# Patient Record
Sex: FEMALE | Race: WHITE
[De-identification: names, ages, dates, MRNs, and addresses within clinical notes are randomized per-mention and may not be internally consistent; named-entity substitution may affect disease eponyms.]

---

## 2017-03-30 ENCOUNTER — HOSPITAL ENCOUNTER (OUTPATIENT)
Dept: HOSPITAL 17 - HRAD | Age: 67
End: 2017-03-30
Attending: THORACIC SURGERY (CARDIOTHORACIC VASCULAR SURGERY)
Payer: MEDICARE

## 2017-03-30 DIAGNOSIS — C34.92: Primary | ICD-10-CM

## 2017-03-30 PROCEDURE — 76140 X-RAY CONSULTATION: CPT

## 2017-03-30 NOTE — RADRPT
EXAM DATE/TIME:  03/30/2017 00:00 

 

[facility] COMPARISON:     No previous studies available for comparison.

OUTSIDE STUDY REVIEWED:    

                  

INDICATIONS :       CT GUIDED BIOPSY R UPPER LOBE

                  

 

FINDINGS:  Images demonstrate a 5 mm nodule right upper lobe which is too small for biopsy. This does
 demonstrate increased FDG activity and is suspicious for malignancy. Considering there is an approxi
mately 8 weeks since the PET scan repeat CT scan is recommended to evaluate for increased size.

 

RECOMMENDATION: 

Lesion is too small for biopsy as visualized. Recommend repeat noncontrast CT of the chest

 

 

 

 

 Ranjit Oquendo MD on March 30, 2017 at 15:07           

Board Certified Radiologist.

 This report was verified electronically.

## 2017-04-07 ENCOUNTER — HOSPITAL ENCOUNTER (INPATIENT)
Dept: HOSPITAL 17 - HSDI | Age: 67
LOS: 5 days | Discharge: HOME HEALTH | DRG: 165 | End: 2017-04-12
Attending: THORACIC SURGERY (CARDIOTHORACIC VASCULAR SURGERY) | Admitting: THORACIC SURGERY (CARDIOTHORACIC VASCULAR SURGERY)
Payer: MEDICARE

## 2017-04-07 ENCOUNTER — HOSPITAL ENCOUNTER (OUTPATIENT)
Dept: HOSPITAL 17 - CPRE | Age: 67
End: 2017-04-07
Attending: THORACIC SURGERY (CARDIOTHORACIC VASCULAR SURGERY)
Payer: MEDICARE

## 2017-04-07 VITALS — WEIGHT: 106.48 LBS | BODY MASS INDEX: 18.87 KG/M2 | HEIGHT: 63 IN

## 2017-04-07 DIAGNOSIS — R94.31: ICD-10-CM

## 2017-04-07 DIAGNOSIS — E78.5: ICD-10-CM

## 2017-04-07 DIAGNOSIS — C34.32: Primary | ICD-10-CM

## 2017-04-07 DIAGNOSIS — I10: ICD-10-CM

## 2017-04-07 DIAGNOSIS — J44.9: ICD-10-CM

## 2017-04-07 DIAGNOSIS — C34.92: ICD-10-CM

## 2017-04-07 DIAGNOSIS — Z01.812: Primary | ICD-10-CM

## 2017-04-07 LAB
ANION GAP SERPL CALC-SCNC: 5 MEQ/L (ref 5–15)
APTT BLD: 24.9 SEC (ref 24.3–30.1)
BUN SERPL-MCNC: 7 MG/DL (ref 7–18)
CHLORIDE SERPL-SCNC: 106 MEQ/L (ref 98–107)
COLOR UR: YELLOW
COMMENT (UR): (no result)
CULTURE IF INDICATED: (no result)
ERYTHROCYTE [DISTWIDTH] IN BLOOD BY AUTOMATED COUNT: 13.4 % (ref 11.6–17.2)
GFR SERPLBLD BASED ON 1.73 SQ M-ARVRAT: 109 ML/MIN (ref 89–?)
GLUCOSE UR STRIP-MCNC: (no result) MG/DL
HCO3 BLD-SCNC: 29.3 MEQ/L (ref 21–32)
HCT VFR BLD CALC: 41 % (ref 35–46)
HGB UR QL STRIP: (no result)
INR PPP: 0.9 RATIO
KETONES UR STRIP-MCNC: (no result) MG/DL
MCH RBC QN AUTO: 29.5 PG (ref 27–34)
MCHC RBC AUTO-ENTMCNC: 32.8 % (ref 32–36)
MCV RBC AUTO: 90 FL (ref 80–100)
NITRITE UR QL STRIP: (no result)
PLATELET # BLD: 269 TH/MM3 (ref 150–450)
POTASSIUM SERPL-SCNC: 4.4 MEQ/L (ref 3.5–5.1)
PROTHROMBIN TIME: 9.9 SEC (ref 9.8–11.6)
RBC # BLD AUTO: 4.55 MIL/MM3 (ref 4–5.3)
REVIEW FLAG: (no result)
SODIUM SERPL-SCNC: 140 MEQ/L (ref 136–145)
SP GR UR STRIP: 1.01 (ref 1–1.03)
WBC # BLD AUTO: 12.3 TH/MM3 (ref 4–11)

## 2017-04-07 PROCEDURE — 85610 PROTHROMBIN TIME: CPT

## 2017-04-07 PROCEDURE — 87015 SPECIMEN INFECT AGNT CONCNTJ: CPT

## 2017-04-07 PROCEDURE — 93005 ELECTROCARDIOGRAM TRACING: CPT

## 2017-04-07 PROCEDURE — 87116 MYCOBACTERIA CULTURE: CPT

## 2017-04-07 PROCEDURE — 88307 TISSUE EXAM BY PATHOLOGIST: CPT

## 2017-04-07 PROCEDURE — 85730 THROMBOPLASTIN TIME PARTIAL: CPT

## 2017-04-07 PROCEDURE — 80048 BASIC METABOLIC PNL TOTAL CA: CPT

## 2017-04-07 PROCEDURE — 86850 RBC ANTIBODY SCREEN: CPT

## 2017-04-07 PROCEDURE — C9290 INJ, BUPIVACAINE LIPOSOME: HCPCS

## 2017-04-07 PROCEDURE — 36415 COLL VENOUS BLD VENIPUNCTURE: CPT

## 2017-04-07 PROCEDURE — 94640 AIRWAY INHALATION TREATMENT: CPT

## 2017-04-07 PROCEDURE — 85027 COMPLETE CBC AUTOMATED: CPT

## 2017-04-07 PROCEDURE — 86900 BLOOD TYPING SEROLOGIC ABO: CPT

## 2017-04-07 PROCEDURE — 87070 CULTURE OTHR SPECIMN AEROBIC: CPT

## 2017-04-07 PROCEDURE — 87206 SMEAR FLUORESCENT/ACID STAI: CPT

## 2017-04-07 PROCEDURE — 86901 BLOOD TYPING SEROLOGIC RH(D): CPT

## 2017-04-07 PROCEDURE — 94664 DEMO&/EVAL PT USE INHALER: CPT

## 2017-04-07 PROCEDURE — 88305 TISSUE EXAM BY PATHOLOGIST: CPT

## 2017-04-07 PROCEDURE — 87102 FUNGUS ISOLATION CULTURE: CPT

## 2017-04-07 PROCEDURE — 81001 URINALYSIS AUTO W/SCOPE: CPT

## 2017-04-07 PROCEDURE — 87205 SMEAR GRAM STAIN: CPT

## 2017-04-07 PROCEDURE — 71010: CPT

## 2017-04-07 PROCEDURE — 94150 VITAL CAPACITY TEST: CPT

## 2017-04-08 NOTE — EKG
Date Performed: 04/07/2017       Time Performed: 12:27:55

 

PTAGE:      66 years

 

EKG:      Sinus rhythm 

 

 LOW QRS VOLTAGE IN EXTREMITY LEADS Compared to previous tracing, previously noted anterolateral ST c
hanges have all improved. Clinical correlation is strongly recommended BORDERLINE ECG 

 

NO PREVIOUS TRACING            

 

DOCTOR:   Carole Zambrano  Interpretating Date/Time  04/08/2017 15:25:37

## 2017-04-10 VITALS
TEMPERATURE: 98.3 F | DIASTOLIC BLOOD PRESSURE: 54 MMHG | OXYGEN SATURATION: 97 % | RESPIRATION RATE: 18 BRPM | HEART RATE: 82 BPM | SYSTOLIC BLOOD PRESSURE: 109 MMHG

## 2017-04-10 VITALS
TEMPERATURE: 98.2 F | DIASTOLIC BLOOD PRESSURE: 63 MMHG | RESPIRATION RATE: 16 BRPM | HEART RATE: 79 BPM | SYSTOLIC BLOOD PRESSURE: 104 MMHG | OXYGEN SATURATION: 99 %

## 2017-04-10 VITALS
DIASTOLIC BLOOD PRESSURE: 70 MMHG | TEMPERATURE: 97.4 F | OXYGEN SATURATION: 100 % | HEART RATE: 70 BPM | RESPIRATION RATE: 16 BRPM | SYSTOLIC BLOOD PRESSURE: 119 MMHG

## 2017-04-10 VITALS
DIASTOLIC BLOOD PRESSURE: 94 MMHG | SYSTOLIC BLOOD PRESSURE: 154 MMHG | TEMPERATURE: 97.6 F | OXYGEN SATURATION: 98 % | HEART RATE: 92 BPM | RESPIRATION RATE: 20 BRPM

## 2017-04-10 VITALS
HEART RATE: 64 BPM | SYSTOLIC BLOOD PRESSURE: 119 MMHG | TEMPERATURE: 97.4 F | OXYGEN SATURATION: 99 % | DIASTOLIC BLOOD PRESSURE: 71 MMHG | RESPIRATION RATE: 16 BRPM

## 2017-04-10 VITALS
OXYGEN SATURATION: 97 % | SYSTOLIC BLOOD PRESSURE: 101 MMHG | RESPIRATION RATE: 18 BRPM | TEMPERATURE: 97.9 F | HEART RATE: 72 BPM | DIASTOLIC BLOOD PRESSURE: 54 MMHG

## 2017-04-10 VITALS — OXYGEN SATURATION: 96 %

## 2017-04-10 PROCEDURE — 0BTC4ZZ RESECTION OF RIGHT UPPER LUNG LOBE, PERCUTANEOUS ENDOSCOPIC APPROACH: ICD-10-PCS | Performed by: THORACIC SURGERY (CARDIOTHORACIC VASCULAR SURGERY)

## 2017-04-10 PROCEDURE — 3E0T3CZ: ICD-10-PCS | Performed by: THORACIC SURGERY (CARDIOTHORACIC VASCULAR SURGERY)

## 2017-04-10 RX ADMIN — ACETAMINOPHEN SCH MG: 10 INJECTION, SOLUTION INTRAVENOUS at 10:00

## 2017-04-10 RX ADMIN — PANTOPRAZOLE SCH MG: 40 TABLET, DELAYED RELEASE ORAL at 21:51

## 2017-04-10 RX ADMIN — DOCUSATE CALCIUM SCH MG: 240 CAPSULE, LIQUID FILLED ORAL at 21:51

## 2017-04-10 RX ADMIN — ACETAMINOPHEN SCH MG: 10 INJECTION, SOLUTION INTRAVENOUS at 16:00

## 2017-04-10 RX ADMIN — ALBUTEROL SULFATE SCH MG: 2.5 SOLUTION RESPIRATORY (INHALATION) at 22:17

## 2017-04-10 RX ADMIN — ACETAMINOPHEN SCH MG: 10 INJECTION, SOLUTION INTRAVENOUS at 21:51

## 2017-04-10 RX ADMIN — CLONIDINE HYDROCHLORIDE SCH MG: 0.1 INJECTION, SOLUTION EPIDURAL at 10:00

## 2017-04-10 RX ADMIN — HYDROCODONE BITARTRATE AND ACETAMINOPHEN PRN TAB: 5; 325 TABLET ORAL at 13:08

## 2017-04-10 RX ADMIN — CLONIDINE HYDROCHLORIDE SCH MG: 0.1 INJECTION, SOLUTION EPIDURAL at 21:51

## 2017-04-10 RX ADMIN — CLONIDINE HYDROCHLORIDE SCH MG: 0.1 INJECTION, SOLUTION EPIDURAL at 16:01

## 2017-04-10 RX ADMIN — ALBUTEROL SULFATE SCH MG: 2.5 SOLUTION RESPIRATORY (INHALATION) at 15:33

## 2017-04-10 NOTE — RADRPT
EXAM DATE/TIME:  04/10/2017 09:38 

 

[facility] COMPARISON:     

No previous studies available for comparison.

 

                     

INDICATIONS :     

Post thoracotomy.

                     

 

MEDICAL HISTORY :     

Hypercholesterolemia.  Hypertension        

 

SURGICAL HISTORY :        

Cardiac cath, Stent.

 

ENCOUNTER:     

Initial                                        

 

ACUITY:     

1 day      

 

PAIN SCORE:     

Non-responsive.

 

LOCATION:     

Bilateral chest 

 

FINDINGS:     

There is hyperinflation. Heart size upper limits normal. Aortic calcification is seen. Right-sided ch
est tube is present with tip overlying the right lung apex. A pneumothorax is not clearly visualized.
 The osseous structures are intact. There is increased density in the right paratracheal region which
 can represent adenopathy or mass.

 

CONCLUSION:     

No evidence of pneumothorax with chest tube in place.

 

 

 

 Félix Cooper MD on April 10, 2017 at 10:14           

Board Certified Radiologist.

 This report was verified electronically.

## 2017-04-10 NOTE — PD.OP
cc:   JOSE Partida MD; Israel Mead MD


__________________________________________________





Operative Report


Date of Surgery:  Apr 10, 2017


Preoperative Diagnosis:  


Postoperative Diagnosis:  


Procedure:








1. Right Video-Assisted Thoracoscopic Surgery (VATS). 


2. Resection of Right Upper Lobe Mass 


3. Intercostal Nerve Block








.


Surgeon:


Israel Mead


Assistant(s):


CHERELLE Talley


Operation and Findings:











PREOPERATIVE DIAGNOSES 


1. Right Upper Lobe Lung Mass. 


2. Left Lower Lobe Lung Cancer


3. COPD 





POSTOPERATIVE DIAGNOSES 


Same





SURGICAL PROCEDURE 


1. Right Video-Assisted Thoracoscopic Surgery (VATS). 


2. Resection of Right Upper Lobe Mass 


3. Intercostal Nerve Block





SURGEON 


Israel Mead MD 





ASSISTANT 


Kathy talley, [facility]





ANESTHESIA 


General double lumen endotracheal. 





ANESTHETIST 


DANIEL Roberson MD





PREPARATION 


ChloraPrep. 





COUNTS 


Needle, sponge, and instrument counts are correct. 





DRAINS 


One 28 Fr CT. 





COMPLICATIONS 


None. 





INDICATIONS 


The patient is a 67 yo lady with left lower lobe lung cancer as well as a right 

upper lobe mass. The patient is being brought to the operating room for 

resection of the right upper lobe mass. 





DESCRIPTION OF PROCEDURE 


The patient was brought to the operating room and placed supine on the OR 

table. Following the induction of adequate general double lumen endotracheal 

anesthesia and placement of appropriate monitoring devices, the patient was 

placed in the left lateral decubitus position. The right chest and surrounding 

areas were then prepped and draped in a standard sterile fashion. A 5 mm camera 

port was introduced into the 7th intercostal space in mid axillary line, and 

the camera introduced. A second 5 mm port was then placed anteriorly under 

direct visual guidance and one posteriorly. The apical segment of the right 

upper lobe was grasped and resected using a ROSALEE stapler. The specimen was 

bagged and removed through the posterior port. The specimen was opened and the 

nodule identified, cultured and sent for definitive histological analysis. The 

middle port was removed and a 28 Fr CT was placed into the pleural space and 

exited through the chest wall. This was maintained in place with a 

nonabsorbable suture. All of the entry sites were injected with Exparel. 

Following confirmation of the catheter in the proper place, the incisions were 

closed with 3 layers. The CT was attached to a suction device, and sterile 

dressing applied. Intercostal nerve block was performed using Ropivacaine/

Morphine solution at the level of the incisions as well as 2 rib spaces above 

and below. The patient tolerated the procedure well and was extubated and 

transferred to the recovery room in stable condition.








Israel Mead MD Apr 10, 2017 13:58

## 2017-04-11 VITALS
RESPIRATION RATE: 18 BRPM | DIASTOLIC BLOOD PRESSURE: 71 MMHG | HEART RATE: 78 BPM | SYSTOLIC BLOOD PRESSURE: 148 MMHG | OXYGEN SATURATION: 100 % | TEMPERATURE: 98 F

## 2017-04-11 VITALS — HEART RATE: 89 BPM

## 2017-04-11 VITALS
RESPIRATION RATE: 20 BRPM | OXYGEN SATURATION: 98 % | HEART RATE: 85 BPM | DIASTOLIC BLOOD PRESSURE: 65 MMHG | SYSTOLIC BLOOD PRESSURE: 118 MMHG | TEMPERATURE: 98.1 F

## 2017-04-11 VITALS
TEMPERATURE: 98 F | RESPIRATION RATE: 16 BRPM | OXYGEN SATURATION: 96 % | SYSTOLIC BLOOD PRESSURE: 140 MMHG | HEART RATE: 84 BPM | DIASTOLIC BLOOD PRESSURE: 81 MMHG

## 2017-04-11 VITALS
SYSTOLIC BLOOD PRESSURE: 141 MMHG | OXYGEN SATURATION: 97 % | DIASTOLIC BLOOD PRESSURE: 84 MMHG | RESPIRATION RATE: 18 BRPM | TEMPERATURE: 97.8 F | HEART RATE: 87 BPM

## 2017-04-11 VITALS — HEART RATE: 79 BPM

## 2017-04-11 VITALS — OXYGEN SATURATION: 96 %

## 2017-04-11 VITALS — HEART RATE: 82 BPM

## 2017-04-11 VITALS — OXYGEN SATURATION: 98 %

## 2017-04-11 VITALS
SYSTOLIC BLOOD PRESSURE: 101 MMHG | HEART RATE: 81 BPM | TEMPERATURE: 97.9 F | RESPIRATION RATE: 18 BRPM | OXYGEN SATURATION: 96 % | DIASTOLIC BLOOD PRESSURE: 60 MMHG

## 2017-04-11 VITALS
TEMPERATURE: 98.2 F | SYSTOLIC BLOOD PRESSURE: 149 MMHG | RESPIRATION RATE: 20 BRPM | HEART RATE: 90 BPM | OXYGEN SATURATION: 98 % | DIASTOLIC BLOOD PRESSURE: 76 MMHG

## 2017-04-11 VITALS — HEART RATE: 90 BPM

## 2017-04-11 VITALS — HEART RATE: 76 BPM

## 2017-04-11 VITALS — HEART RATE: 80 BPM

## 2017-04-11 VITALS — OXYGEN SATURATION: 97 %

## 2017-04-11 VITALS — HEART RATE: 92 BPM

## 2017-04-11 VITALS — HEART RATE: 78 BPM

## 2017-04-11 RX ADMIN — CLONIDINE HYDROCHLORIDE SCH MG: 0.1 INJECTION, SOLUTION EPIDURAL at 04:05

## 2017-04-11 RX ADMIN — DOCUSATE CALCIUM SCH MG: 240 CAPSULE, LIQUID FILLED ORAL at 20:42

## 2017-04-11 RX ADMIN — ACETAMINOPHEN SCH MG: 10 INJECTION, SOLUTION INTRAVENOUS at 04:00

## 2017-04-11 RX ADMIN — ALBUTEROL SULFATE SCH MG: 2.5 SOLUTION RESPIRATORY (INHALATION) at 15:23

## 2017-04-11 RX ADMIN — PANTOPRAZOLE SCH MG: 40 TABLET, DELAYED RELEASE ORAL at 20:42

## 2017-04-11 RX ADMIN — HYDROCODONE BITARTRATE AND ACETAMINOPHEN PRN TAB: 5; 325 TABLET ORAL at 20:47

## 2017-04-11 RX ADMIN — HYDROCODONE BITARTRATE AND ACETAMINOPHEN PRN TAB: 5; 325 TABLET ORAL at 11:43

## 2017-04-11 RX ADMIN — ALBUTEROL SULFATE SCH MG: 2.5 SOLUTION RESPIRATORY (INHALATION) at 04:53

## 2017-04-11 RX ADMIN — ALBUTEROL SULFATE SCH MG: 2.5 SOLUTION RESPIRATORY (INHALATION) at 19:37

## 2017-04-11 RX ADMIN — LISINOPRIL SCH MG: 20 TABLET ORAL at 20:42

## 2017-04-11 RX ADMIN — ASPIRIN SCH MG: 81 TABLET ORAL at 11:43

## 2017-04-11 RX ADMIN — ALBUTEROL SULFATE SCH MG: 2.5 SOLUTION RESPIRATORY (INHALATION) at 10:25

## 2017-04-11 NOTE — RADRPT
EXAM DATE/TIME:  04/11/2017 04:28 

 

[facility] COMPARISON:     

CHEST SINGLE AP, April 10, 2017, 9:38.

 

                     

INDICATIONS :     

Status post thoracotomy.

                     

 

MEDICAL HISTORY :            

Hypercholesterolemia. Hypertension   

 

SURGICAL HISTORY :        

Cardiac cath, Stent.

 

ENCOUNTER:     

Subsequent                                        

 

ACUITY:     

2 days      

 

PAIN SCORE:     

Non-responsive.

 

LOCATION:     

Bilateral chest 

 

FINDINGS:     

A single view of the chest demonstrates right-sided chest tube unchanged. No definite pneumothorax. P
ostsurgical changes in the right upper lobe with residual scarring. The cardiomediastinal contours ar
e unremarkable.  Osseous structures are intact.

 

CONCLUSION:     

1. Postsurgical changes right upper lobe.

2. Right-sided chest tube without pneumothorax.

 

 

 

 Jared Peralta MD on April 11, 2017 at 5:05           

Board Certified Radiologist.

 This report was verified electronically.

## 2017-04-11 NOTE — PD.CAR.PN
CVT Progress Note


CVT: POD #:  1


Subjective/Hospital Course:


66/ female recent diagnosis of left lingular cancer, underwent workup 

demonstrating poorly differentiated adenocarcinoma of left lung,, she also had 

a second smaller right upper lobe nodule that was suspicious for an underlying 

malignancy.





PMH: adenocarcinoma of lung, anxiety , chronic bronchitis, COPD, HTN, HLP





surgery: 4/10  Right Video-Assisted Thoracoscopic Surgery (VATS),  Resection of 

Right Upper Lobe Mass 








4/11


doing well , on room air 


chest tube to suction / drained 32cc/ 12 hrs 


CXR noted no PTX 


pain controlled


Objective:


GENERAL: 


SKIN: Warm and dry.postero lateral incision intact and well approximated / 


HEAD: Normocephalic.


EYES: No scleral icterus. No injection or drainage. 


NECK: Supple, trachea midline. No JVD or lymphadenopathy.


CARDIOVASCULAR: Regular rate and rhythm without murmurs, gallops, or rubs. 


RESPIRATORY: Breath sounds equal bilaterally. No accessory muscle use.


chest tube to wall suction/ no air leak 


GASTROINTESTINAL: Abdomen soft, non-tender, nondistended. 


MUSCULOSKELETAL: No cyanosis, or edema. 


BACK: Nontender without obvious deformity. No CVA tenderness.








 Vital Signs








  Date Time  Temp Pulse Resp B/P Pulse Ox O2 Delivery O2 Flow Rate FiO2


 


4/11/17 08:00     96 Room Air  


 


4/11/17 07:00  84      


 


4/11/17 07:00 98.0 84 16 140/81 96   


 


4/11/17 04:55     96 Nasal Cannula 2.00 


 


4/11/17 04:00     96 Nasal Cannula 2.00 


 


4/11/17 03:00 97.9 81 18 101/60 96   


 


4/11/17 03:00  81      


 


4/11/17 00:00     97 Nasal Cannula 2.00 


 


4/10/17 23:30     89 Room Air  


 


4/10/17 23:00 98.3 82 18 109/54 97   


 


4/10/17 23:00     90 Room Air  


 


4/10/17 23:00  82      


 


4/10/17 22:30     91 Room Air  


 


4/10/17 22:25   18     


 


4/10/17 22:18     96   21


 


4/10/17 20:00     97 Room Air  


 


4/10/17 19:00 97.9 72 18 101/54 97   


 


4/10/17 19:00  72      


 


4/10/17 16:00     99 Room Air  


 


4/10/17 15:00 98.2 79 16 104/63 99   


 


4/10/17 15:00  79      


 


4/10/17 12:00     99 Room Air  


 


4/10/17 11:00 97.4 77 16 119/71 99   


 


4/10/17 11:00  64      


 


4/10/17 10:30  64      


 


4/10/17 10:30 97.4 70 16 119/70 100   


 


4/10/17 10:30     100 Nasal Cannula 2.00 


 


4/10/17 10:15  69 16 109/62 99 Nasal Cannula 2 








Telemetry:


NSR





(1) Lung cancer


(2) right vats 


Plan:  await path report 








keep chest tube to wall suction


pulm toileting


nebs ezpap acapella 





ambulate 





CM to eval for HHC at discharge  





(3) COPD (chronic obstructive pulmonary disease)


Plan:  nebs 





(4) Hyperlipemia


Plan:  resume statin 





(5) Hypertension


Plan:  resume home meds 











Terwilliger,Jacqueline R. ARNP Apr 11, 2017 10:11

## 2017-04-11 NOTE — HHI.FF
Face to Face Verification


Diagnosis:  


(1) Lung cancer


(2) right vats 


(3) Hypertension


(4) Hyperlipemia


(5) COPD (chronic obstructive pulmonary disease)


Home Health Nursing


Order:     Signs/symptoms of disease process


      Wound care and dressing changes


Instructions:


Incentive spirometry Q1 hr x 10, while awake, also use acapella device hourly 

whole awake 





chest wall Precautions: NO pushing or pulling, ( pt must use chest  pillow to 

support chest with all activities and with coughing 





Daily incision care:  ok to shower daily, no tub bath.  Wash all incisions with 

liquid dial soap, clean wash cloth to each site, rinse and pat dry.  Observe 

for any signs of infection, such as drainage which is dark yellow, gray, green 

or foul smelling.  Immediately report to the surgeon any drainage from the 

chest incision, or legs, and for any abnormal drainage from the chest tube 

sites.  Notify surgeon if any temp >101.5 degrees F.  When specialty dressing 

removed/ or if you do not have one, continue to shower daily as above, then 

rinse and pat incision dry and paint with betadine daily x 5 days.  Allow steri 

strips to fall off if you have any.  Avoid lotions, creams, salves, oils, etc. 

for the first month








F/U appointment: as per DC instructions: PCP in 2 weeks, CV surgeon 2 weeks,  


Cardiologist 3-4 weeks





For any questions regarding incisions/ dressing / meds / post op care or above 

                            


Symptoms,








I have seen patient Yoon Fairchild on 4/11/17. My clinical findings 

support the need for the requested home health care services because:


Patient has SOB


Deconditioned w/ increased weakness








I certify that my clinical findings support that this patient is homebound 

because:


Post-op weakness








Terwilliger,Jacqueline R. ARNP Apr 11, 2017 10:18

## 2017-04-12 VITALS — HEART RATE: 89 BPM

## 2017-04-12 VITALS — HEART RATE: 79 BPM

## 2017-04-12 VITALS
OXYGEN SATURATION: 99 % | RESPIRATION RATE: 20 BRPM | DIASTOLIC BLOOD PRESSURE: 68 MMHG | SYSTOLIC BLOOD PRESSURE: 123 MMHG | HEART RATE: 74 BPM | TEMPERATURE: 98.2 F

## 2017-04-12 VITALS — HEART RATE: 77 BPM

## 2017-04-12 VITALS — HEART RATE: 87 BPM

## 2017-04-12 VITALS
OXYGEN SATURATION: 99 % | RESPIRATION RATE: 20 BRPM | DIASTOLIC BLOOD PRESSURE: 75 MMHG | TEMPERATURE: 98.5 F | SYSTOLIC BLOOD PRESSURE: 139 MMHG | HEART RATE: 72 BPM

## 2017-04-12 VITALS
OXYGEN SATURATION: 97 % | HEART RATE: 84 BPM | TEMPERATURE: 97.4 F | DIASTOLIC BLOOD PRESSURE: 73 MMHG | RESPIRATION RATE: 18 BRPM | SYSTOLIC BLOOD PRESSURE: 138 MMHG

## 2017-04-12 VITALS
RESPIRATION RATE: 18 BRPM | DIASTOLIC BLOOD PRESSURE: 67 MMHG | HEART RATE: 61 BPM | OXYGEN SATURATION: 97 % | SYSTOLIC BLOOD PRESSURE: 100 MMHG | TEMPERATURE: 98.1 F

## 2017-04-12 VITALS — HEART RATE: 75 BPM

## 2017-04-12 VITALS — OXYGEN SATURATION: 97 %

## 2017-04-12 VITALS — HEART RATE: 74 BPM

## 2017-04-12 VITALS — HEART RATE: 69 BPM

## 2017-04-12 VITALS — HEART RATE: 96 BPM

## 2017-04-12 RX ADMIN — HYDROCODONE BITARTRATE AND ACETAMINOPHEN PRN TAB: 5; 325 TABLET ORAL at 05:18

## 2017-04-12 RX ADMIN — HYDROCODONE BITARTRATE AND ACETAMINOPHEN PRN TAB: 5; 325 TABLET ORAL at 10:04

## 2017-04-12 RX ADMIN — LISINOPRIL SCH MG: 20 TABLET ORAL at 09:53

## 2017-04-12 RX ADMIN — ASPIRIN SCH MG: 81 TABLET ORAL at 09:53

## 2017-04-12 RX ADMIN — ALBUTEROL SULFATE SCH MG: 2.5 SOLUTION RESPIRATORY (INHALATION) at 10:32

## 2017-04-12 RX ADMIN — ALBUTEROL SULFATE SCH MG: 2.5 SOLUTION RESPIRATORY (INHALATION) at 06:16

## 2017-04-12 NOTE — PD.CAR.PN
CVT Progress Note


Subjective/Hospital Course:


66/ female recent diagnosis of left lingular cancer, underwent workup 

demonstrating poorly differentiated adenocarcinoma of left lung,, she also had 

a second smaller right upper lobe nodule that was suspicious for an underlying 

malignancy.





PMH: adenocarcinoma of lung, anxiety , chronic bronchitis, COPD, HTN, HLP





surgery: 4/10  Right Video-Assisted Thoracoscopic Surgery (VATS),  Resection of 

Right Upper Lobe Mass 








4/11


doing well , on room air 


chest tube to suction / drained 32cc/ 12 hrs 


CXR noted no PTX 


pain controlled 





4/12


chest tube removed without difficulty 


f/u cxr pending 


eval for dc later today 


additional GI motility meds given


Objective:


GENERAL: 


SKIN: Warm and dry.incisions intact right postero lateral chest wall / chest 

tube removed 


HEAD: Normocephalic.


EYES: No scleral icterus. No injection or drainage. 


NECK: Supple, trachea midline. No JVD or lymphadenopathy.


CARDIOVASCULAR: Regular rate and rhythm without murmurs, gallops, or rubs. 


RESPIRATORY: Breath sounds equal bilaterally. No accessory muscle use.


GASTROINTESTINAL: Abdomen soft, non-tender, nondistended. 


MUSCULOSKELETAL: No cyanosis, or edema. 


BACK: Nontender without obvious deformity. No CVA tenderness.








 Vital Signs








  Date Time  Temp Pulse Resp B/P Pulse Ox O2 Delivery O2 Flow Rate FiO2


 


4/12/17 09:02  89      


 


4/12/17 08:54  77      


 


4/12/17 08:54     97 Room Air  


 


4/12/17 07:30 98.1 84 18 100/67 97   


 


4/12/17 07:30  82      


 


4/12/17 06:06  69      


 


4/12/17 05:00  75      


 


4/12/17 04:00     97 Room Air  


 


4/12/17 04:00  72      


 


4/12/17 04:00 98.5 71 20 139/75 99   


 


4/12/17 03:10  74      


 


4/12/17 02:00  74      


 


4/12/17 01:00  74      


 


4/12/17 00:00  74      


 


4/12/17 00:00 98.2 79 20 123/68 99   


 


4/12/17 00:00     95 Nasal Cannula 1.00 


 


4/11/17 23:00  80      


 


4/11/17 22:00  90      


 


4/11/17 21:00  92      


 


4/11/17 20:00 98.2 90 20 149/76 98   


 


4/11/17 20:00  90      


 


4/11/17 20:00     98 Room Air  


 


4/11/17 19:41     98   21


 


4/11/17 19:00  76      


 


4/11/17 18:00  89      


 


4/11/17 17:00  82      


 


4/11/17 16:30 98.1 85 20 118/65 98   


 


4/11/17 16:30     98 Room Air  


 


4/11/17 16:00  82      


 


4/11/17 15:27     96   21


 


4/11/17 15:00  78      


 


4/11/17 14:00  79      


 


4/11/17 13:00  82      


 


4/11/17 12:00     97 Room Air  


 


4/11/17 12:00  90      


 


4/11/17 11:52 97.8 87 18 141/84 97   


 


4/11/17 11:00  89      


 


4/11/17 10:26     97   21











(1) Lung cancer


(2) right vats 


Plan:  path report :pT1a pNX


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 

negative for tumor 














 chest tube removed 


pulm toileting


nebs ezpap acapella 





ambulate 





CM to eval for HHC at discharge  





dc home today





(3) COPD (chronic obstructive pulmonary disease)


Plan:  nebs 





(4) Hyperlipemia


Plan:  resume statin 





(5) Hypertension


Plan:  resume home meds 











Terwilliger,Jacqueline R. ARNP Apr 12, 2017 10:15

## 2017-04-12 NOTE — HHI.DS
Discharge Summary


Admission Date


Apr 10, 2017 at 05:40


Discharge Date:  Apr 12, 2017


Admitting Diagnosis


right lung mass





(1) Lung cancer


Diagnosis:  Principal





(2) COPD (chronic obstructive pulmonary disease)


Diagnosis:  Principal





(3) Hyperlipemia


Diagnosis:  Principal





(4) Hypertension


Diagnosis:  Principal





(5) right vats 


Diagnosis:  Secondary





Procedures


4/10 


1. Right Video-Assisted Thoracoscopic Surgery (VATS). 


2. Resection of Right Upper Lobe Mass 


3. Intercostal Nerve Block


Brief History


66/ female recent diagnosis of left lingular cancer, underwent workup 

demonstrating poorly differentiated adenocarcinoma of left lung,, she also had 

a second smaller right upper lobe nodule that was suspicious for an underlying 

malignancy.


also underwent PET scan 





PMH: adenocarcinoma of lung, anxiety , chronic bronchitis, COPD, HTN, HLP





surgery: 4/10  Right Video-Assisted Thoracoscopic Surgery (VATS),  Resection of 

Right Upper Lobe Mass


Imaging





Last Impressions








Chest X-Ray 4/12/17 1130 Signed





Impressions: 





 Service Date/Time:  Wednesday, April 12, 2017 11:37 - CONCLUSION:  Chest tube 





 removal with questionable minimal residual apical pneumothorax.     Félix Cooper MD 








PE at Discharge


GENERAL: 


SKIN: Warm and dry.incision intact to right lateral chest wall/ chest tube dc 

without difficulty 


HEAD: Normocephalic.


EYES: No scleral icterus. No injection or drainage. 


NECK: Supple, trachea midline. No JVD or lymphadenopathy.


CARDIOVASCULAR: Regular rate and rhythm without murmurs, gallops, or rubs. 


RESPIRATORY: Breath sounds equal bilaterally. No accessory muscle use.


GASTROINTESTINAL: Abdomen soft, non-tender, nondistended. 


MUSCULOSKELETAL: No cyanosis, or edema. 


BACK: Nontender without obvious deformity. No CVA tenderness.





Hospital Course


4/11


doing well , on room air 


chest tube to suction / drained 32cc/ 12 hrs 


CXR noted no PTX 


pain controlled 





4/12


chest tube removed without difficulty 


f/u cxr pending 


eval for dc later today 


additional GI motility meds given


Pt Condition on Discharge:  Good


Discharge Disposition:  Disch w/ Home Health Serv


Discharge Instructions


DIET: Follow Instructions for:  As Tolerated, No Restrictions


Activities you can perform:  Shower Only-No Bath


Activities to avoid:  Lifting/Bending, Strenuous Activity, Driving


Additional Activity Instructio:  


no lifting > 8 lbs or gallon of milk


Follow up Referrals:  


Pulmonology


Surgical





New Medications:  


Docusate Calcium (Stool Softener) 240 Mg Cap


240 MG PO HS Constipation #30 CAP


 


Continued Medications:  


Alendronate (Alendronate) 70 Mg Tab


70 MG PO Q7D Osteporosis Treatment #4 Ref 0 TAB


Amlodipine (Amlodipine) 5 Mg Tab


5 MG PO DAILY Blood Pressure Management #30 Ref 0 TAB


Aspirin DR (Aspirin 81) 81 Mg Tabdr


81 MG PO DAILY Ref 0 TAB


Atorvastatin (Atorvastatin) 80 Mg Tab


80 MG PO HS Cholesterol Management #30 Ref 0 TAB


Lisinopril (Lisinopril) 40 Mg Tab


40 MG PO BID Blood Pressure Management #30 Ref 0 TAB


Multiple Vitamins W/ Minerals (Multi For Her 50+) 1 Tab Tab


1 TAB PO DAILY Nutritional Supplement











Terwilliger,Jacqueline R. ARNP Apr 12, 2017 13:25

## 2017-04-12 NOTE — RADRPT
EXAM DATE/TIME:  04/12/2017 04:05 

 

[facility] COMPARISON:     

CHEST SINGLE AP, April 11, 2017, 4:28.

 

                     

INDICATIONS :     

Shortness of breath.

                     

 

MEDICAL HISTORY :            

Hypercholesterolemia. Hypertension   

 

SURGICAL HISTORY :        

Cardiac cath, Stent.

 

ENCOUNTER:     

Subsequent                                        

 

ACUITY:     

2 days      

 

PAIN SCORE:     

Non-responsive.

 

LOCATION:     

Bilateral  chest

 

FINDINGS:     

A single view of the chest demonstrates clear lungs. Postsurgical changes right lung. Right-sided ti
st tube noted without pneumothorax. The cardiomediastinal contours are unremarkable.  Mediastinum is 
midline. Osseous structures are intact.

 

CONCLUSION:     

Right-sided chest tube without pneumothorax.

 

 

 

 Jared Peralta MD on April 12, 2017 at 5:50           

Board Certified Radiologist.

 This report was verified electronically.

## 2017-04-12 NOTE — RADRPT
EXAM DATE/TIME:  04/12/2017 11:37 

 

[facility] COMPARISON:     

CHEST SINGLE AP, April 12, 2017, 4:05.

 

                     

INDICATIONS :     

Pneumothorax.  Chest tube removal.

                     

 

MEDICAL HISTORY :            

Hypercholesterolemia. Hypertension   

 

SURGICAL HISTORY :        

Cardiac cath, Stent.

 

ENCOUNTER:     

Subsequent                                        

 

ACUITY:     

2 days      

 

PAIN SCORE:     

0/10

 

LOCATION:     

Bilateral chest 

 

FINDINGS:     

Hyperinflation is noted bilaterally. The right-sided chest tube has been removed. Questionable minima
l tiny right apical pneumothorax. Right upper lobe bandlike opacity again noted. Heart size normal.

 

CONCLUSION:     

Chest tube removal with questionable minimal residual apical pneumothorax.

 

 

 

 Félix Cooper MD on April 12, 2017 at 12:11           

Board Certified Radiologist.

 This report was verified electronically.

## 2017-05-08 ENCOUNTER — HOSPITAL ENCOUNTER (INPATIENT)
Dept: HOSPITAL 17 - HSDI | Age: 67
LOS: 44 days | Discharge: HOME HEALTH | DRG: 165 | End: 2017-06-21
Attending: THORACIC SURGERY (CARDIOTHORACIC VASCULAR SURGERY) | Admitting: THORACIC SURGERY (CARDIOTHORACIC VASCULAR SURGERY)
Payer: MEDICARE

## 2017-05-08 VITALS — HEIGHT: 63 IN | WEIGHT: 106.92 LBS | BODY MASS INDEX: 18.95 KG/M2

## 2017-05-08 DIAGNOSIS — E78.5: ICD-10-CM

## 2017-05-08 DIAGNOSIS — J44.9: ICD-10-CM

## 2017-05-08 DIAGNOSIS — F17.210: ICD-10-CM

## 2017-05-08 DIAGNOSIS — I10: ICD-10-CM

## 2017-05-08 DIAGNOSIS — C34.12: Primary | ICD-10-CM

## 2017-05-08 DIAGNOSIS — Z85.118: ICD-10-CM

## 2017-05-08 PROCEDURE — 86900 BLOOD TYPING SEROLOGIC ABO: CPT

## 2017-05-08 PROCEDURE — 88309 TISSUE EXAM BY PATHOLOGIST: CPT

## 2017-05-08 PROCEDURE — 94664 DEMO&/EVAL PT USE INHALER: CPT

## 2017-05-08 PROCEDURE — 81001 URINALYSIS AUTO W/SCOPE: CPT

## 2017-05-08 PROCEDURE — 36430 TRANSFUSION BLD/BLD COMPNT: CPT

## 2017-05-08 PROCEDURE — C9290 INJ, BUPIVACAINE LIPOSOME: HCPCS

## 2017-05-08 PROCEDURE — 80048 BASIC METABOLIC PNL TOTAL CA: CPT

## 2017-05-08 PROCEDURE — 76937 US GUIDE VASCULAR ACCESS: CPT

## 2017-05-08 PROCEDURE — 85025 COMPLETE CBC W/AUTO DIFF WBC: CPT

## 2017-05-08 PROCEDURE — 86920 COMPATIBILITY TEST SPIN: CPT

## 2017-05-08 PROCEDURE — 94640 AIRWAY INHALATION TREATMENT: CPT

## 2017-05-08 PROCEDURE — 85730 THROMBOPLASTIN TIME PARTIAL: CPT

## 2017-05-08 PROCEDURE — 85027 COMPLETE CBC AUTOMATED: CPT

## 2017-05-08 PROCEDURE — P9016 RBC LEUKOCYTES REDUCED: HCPCS

## 2017-05-08 PROCEDURE — 71010: CPT

## 2017-05-08 PROCEDURE — 86901 BLOOD TYPING SEROLOGIC RH(D): CPT

## 2017-05-08 PROCEDURE — 88305 TISSUE EXAM BY PATHOLOGIST: CPT

## 2017-05-08 PROCEDURE — 88307 TISSUE EXAM BY PATHOLOGIST: CPT

## 2017-05-08 PROCEDURE — 86850 RBC ANTIBODY SCREEN: CPT

## 2017-05-08 PROCEDURE — 36415 COLL VENOUS BLD VENIPUNCTURE: CPT

## 2017-05-08 PROCEDURE — 85610 PROTHROMBIN TIME: CPT

## 2017-05-08 PROCEDURE — 94150 VITAL CAPACITY TEST: CPT

## 2017-05-09 ENCOUNTER — HOSPITAL ENCOUNTER (OUTPATIENT)
Dept: HOSPITAL 17 - HRAD | Age: 67
Discharge: HOME | End: 2017-05-09
Attending: INTERNAL MEDICINE
Payer: MEDICARE

## 2017-05-09 VITALS
RESPIRATION RATE: 16 BRPM | SYSTOLIC BLOOD PRESSURE: 145 MMHG | OXYGEN SATURATION: 99 % | DIASTOLIC BLOOD PRESSURE: 85 MMHG | HEART RATE: 82 BPM

## 2017-05-09 VITALS
SYSTOLIC BLOOD PRESSURE: 138 MMHG | TEMPERATURE: 98 F | HEART RATE: 78 BPM | DIASTOLIC BLOOD PRESSURE: 81 MMHG | RESPIRATION RATE: 16 BRPM | OXYGEN SATURATION: 99 %

## 2017-05-09 VITALS
HEART RATE: 88 BPM | SYSTOLIC BLOOD PRESSURE: 137 MMHG | DIASTOLIC BLOOD PRESSURE: 74 MMHG | TEMPERATURE: 97.7 F | OXYGEN SATURATION: 98 % | RESPIRATION RATE: 14 BRPM

## 2017-05-09 DIAGNOSIS — J44.9: ICD-10-CM

## 2017-05-09 DIAGNOSIS — Z85.118: ICD-10-CM

## 2017-05-09 DIAGNOSIS — R22.1: ICD-10-CM

## 2017-05-09 DIAGNOSIS — I25.10: ICD-10-CM

## 2017-05-09 DIAGNOSIS — D11.9: Primary | ICD-10-CM

## 2017-05-09 DIAGNOSIS — Z95.5: ICD-10-CM

## 2017-05-09 DIAGNOSIS — I10: ICD-10-CM

## 2017-05-09 DIAGNOSIS — M81.0: ICD-10-CM

## 2017-05-09 PROCEDURE — 76942 ECHO GUIDE FOR BIOPSY: CPT

## 2017-05-09 PROCEDURE — 38505 NEEDLE BIOPSY LYMPH NODES: CPT

## 2017-05-09 PROCEDURE — 88305 TISSUE EXAM BY PATHOLOGIST: CPT

## 2017-05-09 NOTE — RADRPT
EXAM DATE/TIME:  05/09/2017 13:17 

 

[facility] COMPARISON:     

No previous studies available for comparison.

 

EXTERNAL COMPARISON: 

[facility], PET/CT - TUMOR METABOLISM, Jan 13 2017

 

 

INDICATIONS :      

Palpable left neck nodule x 2. 

 

 

MEDICAL HISTORY :     

Chronic obstructive pulmonary disease. Osteoporosis. Hypertension. Heart valve disease. Left parotid 
gland mass. Left cervical lymphadenopathy. Squamous cell carcinoma. Left upper lobe lung nodule, caitie
ocarcinoma. Coronary artery disease. 

 

SURGICAL HISTORY :     

Coronary artery stent. Hysterectomy.  Cataract removal. Ovarian cyst removal. Biopsy of left upper lo
be nodule. VATs procedure with wedge resection. Colonoscopy. 

 

ENCOUNTER:     

Initial

 

ACUITY:     

4 - 6 months

 

PAIN SCORE:     

0/10

 

LOCATION:     

Left neck. 

                     

 

ORGAN:      

Left lymph node 

 

SPECIMENS:      

Three core specimen(s) submitted for pathologic evaluation.

 

DEVICE:      

18 gauge Temno needle

                     

Post procedure scanning reveals no hematoma or other complication.

 

The possibility does exist that the tissue obtained will be non-diagnostic.  If the sample is non-suly
gnostic a repeat

biopsy or surgical biopsy may need to be performed.  

 

TECHNIQUE:  

1.  Ultrasound guidance for needle biopsy x 2.

2.  Needle biopsy x 2.

 

The risks, benefits, and alternatives to ultrasound guided needle biopsy were explained to the patien
t in detail including the risk of bleeding and infection.  Written and verbal informed consent was ob
tained. 

 

With the patient on the ultrasound table, images were obtained.  Overlying skin was prepped and drape
d in the usual sterile fashion and Lidocaine was utilized as a local anesthetic. 

 

A needle was advanced into the identified target and the number of specimens as above obtained and johnson
bmitted for pathologic evaluation.

 

The patient tolerated the procedure well and left the ultrasound suite in stable condition. 

 

CONCLUSION:     

Uncomplicated ultrasound guided needle biopsy of the 2 enlarged lymph nodes adjacent to the mandible 
of the left neck. 

 

 

 Jared Peralta MD on May 09, 2017 at 16:40           

Board Certified Radiologist.

 This report was verified electronically.

## 2017-06-13 ENCOUNTER — HOSPITAL ENCOUNTER (OUTPATIENT)
Dept: HOSPITAL 17 - CPRE | Age: 67
End: 2017-06-13
Attending: THORACIC SURGERY (CARDIOTHORACIC VASCULAR SURGERY)
Payer: MEDICARE

## 2017-06-13 DIAGNOSIS — Z01.812: Primary | ICD-10-CM

## 2017-06-13 DIAGNOSIS — C34.92: ICD-10-CM

## 2017-06-13 LAB
ANION GAP SERPL CALC-SCNC: 10 MEQ/L (ref 5–15)
APTT BLD: 24.5 SEC (ref 24.3–30.1)
BUN SERPL-MCNC: 9 MG/DL (ref 7–18)
CHLORIDE SERPL-SCNC: 104 MEQ/L (ref 98–107)
COLOR UR: YELLOW
COMMENT (UR): (no result)
CULTURE IF INDICATED: (no result)
ERYTHROCYTE [DISTWIDTH] IN BLOOD BY AUTOMATED COUNT: 13.9 % (ref 11.6–17.2)
GFR SERPLBLD BASED ON 1.73 SQ M-ARVRAT: 115 ML/MIN (ref 89–?)
GLUCOSE UR STRIP-MCNC: (no result) MG/DL
HCO3 BLD-SCNC: 26.1 MEQ/L (ref 21–32)
HCT VFR BLD CALC: 36.3 % (ref 35–46)
HGB UR QL STRIP: (no result)
INR PPP: 0.9 RATIO
KETONES UR STRIP-MCNC: (no result) MG/DL
MCH RBC QN AUTO: 30.3 PG (ref 27–34)
MCHC RBC AUTO-ENTMCNC: 34.4 % (ref 32–36)
MCV RBC AUTO: 88.1 FL (ref 80–100)
NITRITE UR QL STRIP: (no result)
PLATELET # BLD: 335 TH/MM3 (ref 150–450)
POTASSIUM SERPL-SCNC: 4.1 MEQ/L (ref 3.5–5.1)
PROTHROMBIN TIME: 10.1 SEC (ref 9.8–11.6)
RBC # BLD AUTO: 4.12 MIL/MM3 (ref 4–5.3)
REVIEW FLAG: (no result)
SODIUM SERPL-SCNC: 140 MEQ/L (ref 136–145)
SP GR UR STRIP: 1.01 (ref 1–1.03)
SQUAMOUS #/AREA URNS HPF: <1 /HPF (ref 0–5)
WBC # BLD AUTO: 9.7 TH/MM3 (ref 4–11)

## 2017-06-13 PROCEDURE — 85730 THROMBOPLASTIN TIME PARTIAL: CPT

## 2017-06-13 PROCEDURE — 81001 URINALYSIS AUTO W/SCOPE: CPT

## 2017-06-13 PROCEDURE — 85027 COMPLETE CBC AUTOMATED: CPT

## 2017-06-13 PROCEDURE — 36415 COLL VENOUS BLD VENIPUNCTURE: CPT

## 2017-06-13 PROCEDURE — 85610 PROTHROMBIN TIME: CPT

## 2017-06-13 PROCEDURE — 80048 BASIC METABOLIC PNL TOTAL CA: CPT

## 2017-06-16 VITALS
SYSTOLIC BLOOD PRESSURE: 117 MMHG | RESPIRATION RATE: 16 BRPM | OXYGEN SATURATION: 96 % | DIASTOLIC BLOOD PRESSURE: 66 MMHG | TEMPERATURE: 97.6 F | HEART RATE: 93 BPM

## 2017-06-16 VITALS
OXYGEN SATURATION: 96 % | RESPIRATION RATE: 16 BRPM | SYSTOLIC BLOOD PRESSURE: 122 MMHG | DIASTOLIC BLOOD PRESSURE: 77 MMHG | HEART RATE: 101 BPM

## 2017-06-16 VITALS
SYSTOLIC BLOOD PRESSURE: 111 MMHG | RESPIRATION RATE: 16 BRPM | OXYGEN SATURATION: 96 % | HEART RATE: 93 BPM | DIASTOLIC BLOOD PRESSURE: 65 MMHG

## 2017-06-16 VITALS — HEART RATE: 98 BPM

## 2017-06-16 VITALS — OXYGEN SATURATION: 99 %

## 2017-06-16 VITALS
SYSTOLIC BLOOD PRESSURE: 125 MMHG | RESPIRATION RATE: 16 BRPM | OXYGEN SATURATION: 96 % | DIASTOLIC BLOOD PRESSURE: 76 MMHG | HEART RATE: 98 BPM

## 2017-06-16 VITALS
SYSTOLIC BLOOD PRESSURE: 107 MMHG | TEMPERATURE: 98 F | HEART RATE: 77 BPM | RESPIRATION RATE: 16 BRPM | OXYGEN SATURATION: 98 % | DIASTOLIC BLOOD PRESSURE: 67 MMHG

## 2017-06-16 VITALS
OXYGEN SATURATION: 96 % | SYSTOLIC BLOOD PRESSURE: 110 MMHG | RESPIRATION RATE: 16 BRPM | TEMPERATURE: 98 F | HEART RATE: 100 BPM | DIASTOLIC BLOOD PRESSURE: 72 MMHG

## 2017-06-16 VITALS
OXYGEN SATURATION: 96 % | TEMPERATURE: 98 F | RESPIRATION RATE: 16 BRPM | SYSTOLIC BLOOD PRESSURE: 110 MMHG | HEART RATE: 98 BPM | DIASTOLIC BLOOD PRESSURE: 72 MMHG

## 2017-06-16 VITALS
RESPIRATION RATE: 16 BRPM | OXYGEN SATURATION: 95 % | SYSTOLIC BLOOD PRESSURE: 116 MMHG | DIASTOLIC BLOOD PRESSURE: 60 MMHG | HEART RATE: 101 BPM

## 2017-06-16 VITALS — OXYGEN SATURATION: 96 %

## 2017-06-16 VITALS — HEART RATE: 85 BPM

## 2017-06-16 VITALS — HEART RATE: 82 BPM

## 2017-06-16 VITALS — HEART RATE: 100 BPM

## 2017-06-16 VITALS — HEART RATE: 88 BPM

## 2017-06-16 VITALS — HEART RATE: 104 BPM

## 2017-06-16 VITALS — HEART RATE: 97 BPM

## 2017-06-16 PROCEDURE — 07B74ZX EXCISION OF THORAX LYMPHATIC, PERCUTANEOUS ENDOSCOPIC APPROACH, DIAGNOSTIC: ICD-10-PCS | Performed by: THORACIC SURGERY (CARDIOTHORACIC VASCULAR SURGERY)

## 2017-06-16 PROCEDURE — 3E0T3BZ INTRODUCTION OF ANESTHETIC AGENT INTO PERIPHERAL NERVES AND PLEXI, PERCUTANEOUS APPROACH: ICD-10-PCS | Performed by: THORACIC SURGERY (CARDIOTHORACIC VASCULAR SURGERY)

## 2017-06-16 PROCEDURE — 0BTG4ZZ RESECTION OF LEFT UPPER LUNG LOBE, PERCUTANEOUS ENDOSCOPIC APPROACH: ICD-10-PCS | Performed by: THORACIC SURGERY (CARDIOTHORACIC VASCULAR SURGERY)

## 2017-06-16 PROCEDURE — 8E0W4CZ ROBOTIC ASSISTED PROCEDURE OF TRUNK REGION, PERCUTANEOUS ENDOSCOPIC APPROACH: ICD-10-PCS | Performed by: THORACIC SURGERY (CARDIOTHORACIC VASCULAR SURGERY)

## 2017-06-16 RX ADMIN — CLONIDINE HYDROCHLORIDE SCH MG: 0.1 INJECTION, SOLUTION EPIDURAL at 18:01

## 2017-06-16 RX ADMIN — ALBUTEROL SULFATE SCH MG: 2.5 SOLUTION RESPIRATORY (INHALATION) at 20:21

## 2017-06-16 RX ADMIN — DOCUSATE CALCIUM SCH MG: 240 CAPSULE, LIQUID FILLED ORAL at 21:00

## 2017-06-16 RX ADMIN — ALBUTEROL SULFATE SCH MG: 2.5 SOLUTION RESPIRATORY (INHALATION) at 17:01

## 2017-06-16 RX ADMIN — CEFAZOLIN SODIUM SCH MLS/HR: 1 POWDER, FOR SOLUTION INTRAMUSCULAR; INTRAVENOUS at 16:58

## 2017-06-16 RX ADMIN — PANTOPRAZOLE SCH MG: 40 TABLET, DELAYED RELEASE ORAL at 22:27

## 2017-06-16 RX ADMIN — CLONIDINE HYDROCHLORIDE SCH MG: 0.1 INJECTION, SOLUTION EPIDURAL at 22:30

## 2017-06-16 RX ADMIN — SODIUM CHLORIDE, PRESERVATIVE FREE SCH ML: 5 INJECTION INTRAVENOUS at 21:00

## 2017-06-16 RX ADMIN — CLONIDINE HYDROCHLORIDE SCH MG: 0.1 INJECTION, SOLUTION EPIDURAL at 12:00

## 2017-06-16 RX ADMIN — ATORVASTATIN CALCIUM SCH MG: 80 TABLET, FILM COATED ORAL at 22:27

## 2017-06-16 NOTE — HHI.FF
Face to Face Verification


Diagnosis:  


(1) Lung cancer


(2) COPD (chronic obstructive pulmonary disease)


(3) Hyperlipemia


(4) Hypertension


(5) right vats 


Home Health Nursing


Order:     Wound care and dressing changes


      Nursing assessment with vital signs


Instructions:


Incentive spirometry Q1 hr x 10, while awake, also use acapella device hourly 

whole awake 


chest wall 


chest wall  Precautions: NO pushing or pulling, ( pt must use chest  pillow to 

support chest with all activities and with coughing 





Daily incision care:  ok to shower daily, no tub bath.  Wash all incisions with 

liquid dial soap, clean wash cloth to each site, rinse and pat dry.  Observe 

for any signs of infection, such as drainage which is dark yellow, gray, green 

or foul smelling.  Immediately report to the surgeon any drainage from the 

chest incision, or legs, and for any abnormal drainage from the chest tube 

sites.  Notify surgeon if any temp >101.5 degrees F.  When specialty dressing 

removed/ or if you do not have one, continue to shower daily as above, then 

rinse and pat incision dry and paint with betadine daily x 5 days.  Allow steri 

strips to fall off if you have any.  Avoid lotions, creams, salves, oils, etc. 

for the first month





F/U appointment: as per MD instructions: PCP in 2 weeks, CV surgeon 2 weeks,  


pulmonologist  3-4 weeks, oncologist 4 weeks 





For any questions regarding incisions/ dressing / meds / post op care or above 

                            


Symptoms, 





Monday Friday 8am-5pm   Heart & Vascular Surgery Office


(  Dr. Mead & Dr. Oliver), [phone]





After Hours / Nights (5pm -8am) Weekends and Holidays 


Please call [facility] Card[facility] Unit ([facility]) Charge Nurse 


[phone]








I have seen patient Yoon Fairchild on 6/16/17. My clinical findings 

support the need for the requested home health care services because:


Deconditioned w/ increased weakness








I certify that my clinical findings support that this patient is homebound 

because:


Post-op weakness








Terwilliger,Jacqueline R. ARNP Jun 16, 2017 14:10

## 2017-06-16 NOTE — RADRPT
EXAM DATE/TIME:  06/16/2017 12:34 

 

[facility] COMPARISON:     

CHEST SINGLE AP, April 12, 2017, 11:37.

 

                     

INDICATIONS :     

Post op thoracotomy.

                     

 

MEDICAL HISTORY :     

Hypercholesterolemia. Hypertension   

 

SURGICAL HISTORY :     

None.   Cardiac cath, Stent.

 

ENCOUNTER:     

Initial                                        

 

ACUITY:     

1 day      

 

PAIN SCORE:     

0/10

 

LOCATION:     

Bilateral chest 

 

FINDINGS:     

A left chest tube is noted in good position.  No definite pneumothorax is noted on the left.  The hea
rt is stable.  The pulmonary vascularity pattern is normal.  The lungs are clear.  

 

CONCLUSION:     

 

1.  Left chest tube is in good position without evidence of pneumothorax. 

 

 Quintin Finnegan MD on June 16, 2017 at 12:49                

Board Certified Radiologist.

 This report was verified electronically.

## 2017-06-16 NOTE — PD.OP
cc:   JOSE Partida MD; Israel Mead MD; Aiden Atkins MD


__________________________________________________





Operative Report


Date of Surgery:  Jun 16, 2017


Preoperative Diagnosis:  


Postoperative Diagnosis:  


Procedure:








1. Robotic Left Upper Lobectomy


2. Mediastinal Lymph Node Dissection. 


3. Intercostal Nerve Block


.


Surgeon:


Israel Mead


Assistant(s):


CHERELLE Madrigal


Operation and Findings:











PREOPERATIVE DIAGNOSIS 


1. Left Upper Lobe Adenocarcinoma


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


3. Head & Neck Cancer


4. COPD





POSTOPERATIVE DIAGNOSIS 


same





PROCEDURES 


1. Robotic Left Upper Lobectomy


2. Mediastinal Lymph Node Dissection. 


3. Intercostal Nerve Block





SURGEON 


Israel Mead MD


 


ASSISTANT 


Kathy Madrigal CSFA





ANESTHESIA 


General endotracheal. 





ANESTHETIST


DANIEL Wayne MD





OPERATIVE TIME 


Please see record. 





COMPLICATIONS 


None. 





INDICATION FOR PROCEDURE 


The patient is a 66yo lady with multiple synchronous primary malignancies now 

with left lung cancer. 





DESCRIPTION OF PROCEDURE 


The patient was brought to the operating suite and placed in supine position. 

Following satisfactory induction of general endotracheal anesthesia, the 

patient was placed in the right lateral decubitus position. The left chest was 

then prepped and draped in the usual sterile fashion.  Under direct vision, 

camera was introduced in the 8th ICS and insufflation was begun into the chest 

. Instrument arm 1and 2 were placed. Lesion was identified. The inferior 

pulmonary ligament was divided. The pulmonary arterial supply to the upper lobe 

was identified, dissected free and divided as was the pulmonary venous supply. 

The bronchus was then dissected free, clamped and the remaining lung was 

insufflated without any difficulty. Lymph node dissections of level 5, 6 and 7 

were explored but adenopathy was identified. Specimen was bagged and removed 

from the chest. A 28-Nigerien chest tube was placed. Intercostal nerve block was 

performed at the level of the incision and 3 rib spaces above and below using 

Exparel with Decadron solution. Evicel was sprayed along the staple line and no 

air-leaks were identified. Wounds were closed with 2-0, 3-0, and 4-0 Monocryl. 

The patient tolerated the procedure well and postoperatively went to recovery 

in stable condition.








Israel Mead MD Jun 16, 2017 14:21

## 2017-06-17 VITALS
OXYGEN SATURATION: 95 % | RESPIRATION RATE: 16 BRPM | TEMPERATURE: 98.3 F | SYSTOLIC BLOOD PRESSURE: 122 MMHG | DIASTOLIC BLOOD PRESSURE: 75 MMHG | HEART RATE: 95 BPM

## 2017-06-17 VITALS
RESPIRATION RATE: 16 BRPM | SYSTOLIC BLOOD PRESSURE: 119 MMHG | DIASTOLIC BLOOD PRESSURE: 63 MMHG | TEMPERATURE: 97.6 F | OXYGEN SATURATION: 97 % | HEART RATE: 92 BPM

## 2017-06-17 VITALS — HEART RATE: 94 BPM

## 2017-06-17 VITALS — OXYGEN SATURATION: 97 %

## 2017-06-17 VITALS
TEMPERATURE: 98.3 F | HEART RATE: 83 BPM | OXYGEN SATURATION: 97 % | RESPIRATION RATE: 16 BRPM | DIASTOLIC BLOOD PRESSURE: 74 MMHG | SYSTOLIC BLOOD PRESSURE: 122 MMHG

## 2017-06-17 VITALS
HEART RATE: 101 BPM | RESPIRATION RATE: 16 BRPM | OXYGEN SATURATION: 93 % | TEMPERATURE: 99.1 F | SYSTOLIC BLOOD PRESSURE: 139 MMHG | DIASTOLIC BLOOD PRESSURE: 70 MMHG

## 2017-06-17 VITALS
RESPIRATION RATE: 16 BRPM | SYSTOLIC BLOOD PRESSURE: 117 MMHG | DIASTOLIC BLOOD PRESSURE: 70 MMHG | TEMPERATURE: 98.2 F | HEART RATE: 91 BPM | OXYGEN SATURATION: 94 %

## 2017-06-17 VITALS
RESPIRATION RATE: 16 BRPM | HEART RATE: 89 BPM | DIASTOLIC BLOOD PRESSURE: 65 MMHG | OXYGEN SATURATION: 97 % | TEMPERATURE: 98.4 F | SYSTOLIC BLOOD PRESSURE: 96 MMHG

## 2017-06-17 VITALS — HEART RATE: 88 BPM

## 2017-06-17 VITALS
RESPIRATION RATE: 16 BRPM | HEART RATE: 101 BPM | SYSTOLIC BLOOD PRESSURE: 143 MMHG | OXYGEN SATURATION: 93 % | TEMPERATURE: 98.6 F | DIASTOLIC BLOOD PRESSURE: 81 MMHG

## 2017-06-17 VITALS — HEART RATE: 86 BPM

## 2017-06-17 VITALS — HEART RATE: 92 BPM

## 2017-06-17 VITALS — HEART RATE: 79 BPM

## 2017-06-17 VITALS — HEART RATE: 101 BPM

## 2017-06-17 VITALS — HEART RATE: 82 BPM

## 2017-06-17 VITALS — HEART RATE: 104 BPM

## 2017-06-17 VITALS — OXYGEN SATURATION: 96 %

## 2017-06-17 VITALS — HEART RATE: 74 BPM

## 2017-06-17 VITALS — HEART RATE: 108 BPM

## 2017-06-17 VITALS — HEART RATE: 85 BPM

## 2017-06-17 VITALS — HEART RATE: 72 BPM

## 2017-06-17 VITALS — HEART RATE: 87 BPM

## 2017-06-17 LAB
ANION GAP SERPL CALC-SCNC: 6 MEQ/L (ref 5–15)
BASOPHILS # BLD AUTO: 0.1 TH/MM3 (ref 0–0.2)
BASOPHILS NFR BLD: 0.5 % (ref 0–2)
BUN SERPL-MCNC: 10 MG/DL (ref 7–18)
CHLORIDE SERPL-SCNC: 107 MEQ/L (ref 98–107)
EOSINOPHIL # BLD: 0 TH/MM3 (ref 0–0.4)
EOSINOPHIL NFR BLD: 0.1 % (ref 0–4)
ERYTHROCYTE [DISTWIDTH] IN BLOOD BY AUTOMATED COUNT: 13.6 % (ref 11.6–17.2)
GFR SERPLBLD BASED ON 1.73 SQ M-ARVRAT: 113 ML/MIN (ref 89–?)
HCO3 BLD-SCNC: 27.5 MEQ/L (ref 21–32)
HCT VFR BLD CALC: 30.1 % (ref 35–46)
HEMO FLAGS: (no result)
LYMPHOCYTES # BLD AUTO: 2 TH/MM3 (ref 1–4.8)
LYMPHOCYTES NFR BLD AUTO: 13.3 % (ref 9–44)
MCH RBC QN AUTO: 29.4 PG (ref 27–34)
MCHC RBC AUTO-ENTMCNC: 32.9 % (ref 32–36)
MCV RBC AUTO: 89.4 FL (ref 80–100)
MONOCYTES NFR BLD: 6.5 % (ref 0–8)
NEUTROPHILS # BLD AUTO: 11.8 TH/MM3 (ref 1.8–7.7)
NEUTROPHILS NFR BLD AUTO: 79.6 % (ref 16–70)
PLATELET # BLD: 266 TH/MM3 (ref 150–450)
POTASSIUM SERPL-SCNC: 3.9 MEQ/L (ref 3.5–5.1)
RBC # BLD AUTO: 3.37 MIL/MM3 (ref 4–5.3)
SODIUM SERPL-SCNC: 140 MEQ/L (ref 136–145)
WBC # BLD AUTO: 14.8 TH/MM3 (ref 4–11)

## 2017-06-17 RX ADMIN — CETIRIZINE HYDROCHLORIDE SCH MG: 10 TABLET, FILM COATED ORAL at 09:04

## 2017-06-17 RX ADMIN — PANTOPRAZOLE SCH MG: 40 TABLET, DELAYED RELEASE ORAL at 21:02

## 2017-06-17 RX ADMIN — MAGNESIUM HYDROXIDE PRN ML: 400 SUSPENSION ORAL at 18:12

## 2017-06-17 RX ADMIN — HYDROCODONE BITARTRATE AND ACETAMINOPHEN PRN TAB: 5; 325 TABLET ORAL at 14:45

## 2017-06-17 RX ADMIN — CLONIDINE HYDROCHLORIDE SCH MG: 0.1 INJECTION, SOLUTION EPIDURAL at 06:00

## 2017-06-17 RX ADMIN — ALBUTEROL SULFATE SCH MG: 2.5 SOLUTION RESPIRATORY (INHALATION) at 03:24

## 2017-06-17 RX ADMIN — CLONIDINE HYDROCHLORIDE SCH MG: 0.1 INJECTION, SOLUTION EPIDURAL at 22:49

## 2017-06-17 RX ADMIN — HYDROCODONE BITARTRATE AND ACETAMINOPHEN PRN TAB: 5; 325 TABLET ORAL at 23:00

## 2017-06-17 RX ADMIN — CEFAZOLIN SODIUM SCH MLS/HR: 1 POWDER, FOR SOLUTION INTRAMUSCULAR; INTRAVENOUS at 04:21

## 2017-06-17 RX ADMIN — ATORVASTATIN CALCIUM SCH MG: 80 TABLET, FILM COATED ORAL at 21:02

## 2017-06-17 RX ADMIN — SODIUM CHLORIDE, PRESERVATIVE FREE SCH ML: 5 INJECTION INTRAVENOUS at 21:00

## 2017-06-17 RX ADMIN — SODIUM CHLORIDE, PRESERVATIVE FREE SCH ML: 5 INJECTION INTRAVENOUS at 09:00

## 2017-06-17 RX ADMIN — DOCUSATE CALCIUM SCH MG: 240 CAPSULE, LIQUID FILLED ORAL at 21:02

## 2017-06-17 RX ADMIN — ALBUTEROL SULFATE SCH MG: 2.5 SOLUTION RESPIRATORY (INHALATION) at 09:17

## 2017-06-17 RX ADMIN — HYDROCODONE BITARTRATE AND ACETAMINOPHEN PRN TAB: 5; 325 TABLET ORAL at 04:20

## 2017-06-17 RX ADMIN — HYDROCODONE BITARTRATE AND ACETAMINOPHEN PRN TAB: 5; 325 TABLET ORAL at 18:17

## 2017-06-17 RX ADMIN — CEFAZOLIN SODIUM SCH MLS/HR: 1 POWDER, FOR SOLUTION INTRAMUSCULAR; INTRAVENOUS at 09:04

## 2017-06-17 RX ADMIN — ALBUTEROL SULFATE SCH MG: 2.5 SOLUTION RESPIRATORY (INHALATION) at 21:15

## 2017-06-17 NOTE — PD.CAR.PN
CVT Progress Note


Subjective/Hospital Course:


66 yo pleasant lady with synchronous lung malignancies





6/16 


PROCEDURES 


1. Robotic Left Upper Lobectomy


2. Mediastinal Lymph Node Dissection. 


3. Intercostal Nerve Block





6/17


Doing well


Air-leak with valsalva


Maintain CT to water seal


Ambulate


Objective:





 Vital Signs








  Date Time  Temp Pulse Resp B/P Pulse Ox O2 Delivery O2 Flow Rate FiO2


 


6/17/17 09:18     96   


 


6/17/17 08:30  95      


 


6/17/17 08:30 98.3 95 16 122/75 95   


 


6/17/17 05:00  74      


 


6/17/17 04:08 98.2 91 16 117/70 94   


 


6/17/17 04:00  72      


 


6/17/17 03:00  87      


 


6/17/17 02:00  86      


 


6/17/17 01:07 97.6 92 16 119/63 97   


 


6/17/17 01:00  86      


 


6/17/17 00:00  86      


 


6/16/17 23:00  85      


 


6/16/17 22:00  104      


 


6/16/17 21:53 97.6 93 16 117/66 96   


 


6/16/17 21:00  88      


 


6/16/17 20:21     99   


 


6/16/17 20:00  82      


 


6/16/17 19:00  97      


 


6/16/17 19:00   16     


 


6/16/17 18:00  98      


 


6/16/17 17:30  98 16 125/76 96   


 


6/16/17 17:15  93 16 111/65 96   


 


6/16/17 17:04     96   


 


6/16/17 17:00  98      


 


6/16/17 17:00  100 16 110/72 96   


 


6/16/17 17:00  100      


 


6/16/17 16:45  101 16 116/60 95   


 


6/16/17 16:30  101 16 122/77 96   


 


6/16/17 16:15 98.0 100 16 110/72 96   


 


6/16/17 16:00  100      


 


6/16/17 15:30  95 21 105/58 98 Nasal Cannula 2 


 


6/16/17 15:15  100 22 110/66 98 Nasal Cannula 2 


 


6/16/17 15:00  88 17 104/58 98 Nasal Cannula 2 


 


6/16/17 14:45  89 14 102/57 98 Nasal Cannula 2 


 


6/16/17 14:30  93 21 100/55 98 Nasal Cannula 2 


 


6/16/17 14:15  93 21 137/61 98 Nasal Cannula 2 


 


6/16/17 14:00  90 22 82/54 98 Nasal Cannula 2 


 


6/16/17 13:45  75 18 83/54 98 Nasal Cannula 2 


 


6/16/17 13:30 97.5 84 18 86/54 98 Nasal Cannula 2 


 


6/16/17 13:15  83 18 89/51 97 Nasal Cannula 2 


 


6/16/17 13:00  74 13 92/51 96 Nasal Cannula 2 


 


6/16/17 12:45  76 15 98/53 99 Nasal Cannula 2 


 


6/16/17 12:30  79 20 105/52 98 Nasal Cannula 2 


 


6/16/17 12:17 96.8 81 25 107/53 96 Nasal Cannula 2 








Labs:





Laboratory Tests








Test 6/17/17





 05:07


 


White Blood Count 14.8 TH/MM3





 (4.0-11.0)


 


Red Blood Count 3.37 MIL/MM3





 (4.00-5.30)


 


Hemoglobin 9.9 GM/DL





 (11.6-15.3)


 


Hematocrit 30.1 %





 (35.0-46.0)


 


Mean Corpuscular Volume 89.4 FL





 (80.0-100.0)


 


Mean Corpuscular Hemoglobin 29.4 PG





 (27.0-34.0)


 


Mean Corpuscular Hemoglobin 32.9 %





Concent (32.0-36.0)


 


Red Cell Distribution Width 13.6 %





 (11.6-17.2)


 


Platelet Count 266 TH/MM3





 (150-450)


 


Mean Platelet Volume 7.0 FL





 (7.0-11.0)


 


Neutrophils (%) (Auto) 79.6 %





 (16.0-70.0)


 


Lymphocytes (%) (Auto) 13.3 %





 (9.0-44.0)


 


Monocytes (%) (Auto) 6.5 % (0.0-8.0)


 


Eosinophils (%) (Auto) 0.1 % (0.0-4.0)


 


Basophils (%) (Auto) 0.5 % (0.0-2.0)


 


Neutrophils # (Auto) 11.8 TH/MM3





 (1.8-7.7)


 


Lymphocytes # (Auto) 2.0 TH/MM3





 (1.0-4.8)


 


Monocytes # (Auto) 1.0 TH/MM3





 (0-0.9)


 


Eosinophils # (Auto) 0.0 TH/MM3





 (0-0.4)


 


Basophils # (Auto) 0.1 TH/MM3





 (0-0.2)


 


CBC Comment DIFF FINAL 


 


Differential Comment  


 


Sodium Level 140 MEQ/L





 (136-145)


 


Potassium Level 3.9 MEQ/L





 (3.5-5.1)


 


Chloride Level 107 MEQ/L





 ()


 


Carbon Dioxide Level 27.5 MEQ/L





 (21.0-32.0)


 


Anion Gap 6 MEQ/L (5-15)


 


Blood Urea Nitrogen 10 MG/DL (7-18)


 


Creatinine 0.54 MG/DL





 (0.50-1.00)


 


Estimat Glomerular Filtration 113 ML/MIN





Rate (>89)


 


Random Glucose 117 MG/DL





 ()


 


Calcium Level 8.2 MG/DL





 (8.5-10.1)








Result Diagram:  


6/17/17 0507                                                                   

             6/17/17 0507











Israel Mead MD Jun 17, 2017 09:39

## 2017-06-18 VITALS — HEART RATE: 83 BPM

## 2017-06-18 VITALS
DIASTOLIC BLOOD PRESSURE: 90 MMHG | RESPIRATION RATE: 20 BRPM | HEART RATE: 72 BPM | SYSTOLIC BLOOD PRESSURE: 129 MMHG | TEMPERATURE: 98.3 F | OXYGEN SATURATION: 97 %

## 2017-06-18 VITALS
TEMPERATURE: 98.5 F | HEART RATE: 89 BPM | DIASTOLIC BLOOD PRESSURE: 72 MMHG | SYSTOLIC BLOOD PRESSURE: 125 MMHG | RESPIRATION RATE: 16 BRPM | OXYGEN SATURATION: 96 %

## 2017-06-18 VITALS — HEART RATE: 78 BPM

## 2017-06-18 VITALS
OXYGEN SATURATION: 96 % | HEART RATE: 85 BPM | SYSTOLIC BLOOD PRESSURE: 117 MMHG | TEMPERATURE: 98.4 F | RESPIRATION RATE: 20 BRPM | DIASTOLIC BLOOD PRESSURE: 62 MMHG

## 2017-06-18 VITALS — HEART RATE: 101 BPM

## 2017-06-18 VITALS — HEART RATE: 77 BPM

## 2017-06-18 VITALS
HEART RATE: 98 BPM | RESPIRATION RATE: 16 BRPM | TEMPERATURE: 98 F | SYSTOLIC BLOOD PRESSURE: 111 MMHG | OXYGEN SATURATION: 93 % | DIASTOLIC BLOOD PRESSURE: 63 MMHG

## 2017-06-18 VITALS — HEART RATE: 86 BPM

## 2017-06-18 VITALS
TEMPERATURE: 97.8 F | SYSTOLIC BLOOD PRESSURE: 144 MMHG | HEART RATE: 87 BPM | DIASTOLIC BLOOD PRESSURE: 81 MMHG | RESPIRATION RATE: 20 BRPM | OXYGEN SATURATION: 96 %

## 2017-06-18 VITALS — HEART RATE: 92 BPM

## 2017-06-18 VITALS — HEART RATE: 81 BPM

## 2017-06-18 VITALS — HEART RATE: 73 BPM

## 2017-06-18 VITALS — HEART RATE: 90 BPM

## 2017-06-18 VITALS — HEART RATE: 80 BPM

## 2017-06-18 VITALS — HEART RATE: 97 BPM

## 2017-06-18 VITALS — HEART RATE: 79 BPM

## 2017-06-18 VITALS — HEART RATE: 82 BPM

## 2017-06-18 VITALS — HEART RATE: 94 BPM

## 2017-06-18 RX ADMIN — SODIUM CHLORIDE, PRESERVATIVE FREE SCH ML: 5 INJECTION INTRAVENOUS at 09:00

## 2017-06-18 RX ADMIN — MAGNESIUM HYDROXIDE PRN ML: 400 SUSPENSION ORAL at 09:16

## 2017-06-18 RX ADMIN — CLONIDINE HYDROCHLORIDE SCH MG: 0.1 INJECTION, SOLUTION EPIDURAL at 09:09

## 2017-06-18 RX ADMIN — ALBUTEROL SULFATE SCH MG: 2.5 SOLUTION RESPIRATORY (INHALATION) at 09:22

## 2017-06-18 RX ADMIN — CLONIDINE HYDROCHLORIDE SCH MG: 0.1 INJECTION, SOLUTION EPIDURAL at 04:38

## 2017-06-18 RX ADMIN — DOCUSATE CALCIUM SCH MG: 240 CAPSULE, LIQUID FILLED ORAL at 20:26

## 2017-06-18 RX ADMIN — HYDROCODONE BITARTRATE AND ACETAMINOPHEN PRN TAB: 5; 325 TABLET ORAL at 14:23

## 2017-06-18 RX ADMIN — PANTOPRAZOLE SCH MG: 40 TABLET, DELAYED RELEASE ORAL at 20:28

## 2017-06-18 RX ADMIN — ALBUTEROL SULFATE SCH MG: 2.5 SOLUTION RESPIRATORY (INHALATION) at 15:12

## 2017-06-18 RX ADMIN — ALBUTEROL SULFATE SCH MG: 2.5 SOLUTION RESPIRATORY (INHALATION) at 20:33

## 2017-06-18 RX ADMIN — ATORVASTATIN CALCIUM SCH MG: 80 TABLET, FILM COATED ORAL at 20:24

## 2017-06-18 RX ADMIN — CLONIDINE HYDROCHLORIDE SCH MG: 0.1 INJECTION, SOLUTION EPIDURAL at 16:22

## 2017-06-18 RX ADMIN — HYDROCODONE BITARTRATE AND ACETAMINOPHEN PRN TAB: 5; 325 TABLET ORAL at 20:26

## 2017-06-18 RX ADMIN — CETIRIZINE HYDROCHLORIDE SCH MG: 10 TABLET, FILM COATED ORAL at 09:09

## 2017-06-18 RX ADMIN — HYDROCODONE BITARTRATE AND ACETAMINOPHEN PRN TAB: 5; 325 TABLET ORAL at 09:10

## 2017-06-18 RX ADMIN — HYDROCODONE BITARTRATE AND ACETAMINOPHEN PRN TAB: 5; 325 TABLET ORAL at 04:06

## 2017-06-18 RX ADMIN — SODIUM CHLORIDE, PRESERVATIVE FREE SCH ML: 5 INJECTION INTRAVENOUS at 20:28

## 2017-06-18 RX ADMIN — ALBUTEROL SULFATE SCH MG: 2.5 SOLUTION RESPIRATORY (INHALATION) at 03:30

## 2017-06-18 NOTE — PD.CAR.PN
CVT Progress Note


Subjective/Hospital Course:


68 yo pleasant lady with synchronous lung malignancies





6/16 


PROCEDURES 


1. Robotic Left Upper Lobectomy


2. Mediastinal Lymph Node Dissection. 


3. Intercostal Nerve Block





6/17


Doing well


Air-leak with valsalva


Maintain CT to water seal


Ambulate





6/18


Doing  great


No discernible air-leak


Keep CT


re-evaluate in am for removal


Pathology pending


Objective:





 Vital Signs








  Date Time  Temp Pulse Resp B/P Pulse Ox O2 Delivery O2 Flow Rate FiO2


 


6/18/17 08:00 98.3 85 20 129/90 97   


 


6/18/17 06:00  73      


 


6/18/17 05:00  86      


 


6/18/17 04:59 98.0 98 16 111/63 93   


 


6/18/17 04:00  92      


 


6/18/17 03:00  90      


 


6/18/17 02:00  80      


 


6/18/17 01:00  86      


 


6/18/17 00:00  90      


 


6/17/17 23:20 98.6 101 16 143/81 93   


 


6/17/17 23:00  101      


 


6/17/17 22:00  108      


 


6/17/17 21:15     97   21


 


6/17/17 21:00  94      


 


6/17/17 20:45 99.1 101 16 139/70 93   


 


6/17/17 20:00  104      


 


6/17/17 19:00  92      


 


6/17/17 18:00  88      


 


6/17/17 17:00  88      


 


6/17/17 16:00  86      


 


6/17/17 16:00   16     


 


6/17/17 15:00 98.3 83 16 122/74 97   


 


6/17/17 14:00  79      


 


6/17/17 13:00  85      


 


6/17/17 12:00  87      


 


6/17/17 11:00 98.4 89 16 96/65 97   


 


6/17/17 10:00  82      








Result Diagram:  


6/17/17 0507                                                                   

             6/17/17 0507











Israel Mead MD Jun 18, 2017 09:40

## 2017-06-18 NOTE — RADRPT
EXAM DATE/TIME:  06/18/2017 05:58 

 

[facility] COMPARISON:     

CHEST SINGLE AP, June 16, 2017, 12:34.

 

                     

INDICATIONS :     

Shortness of breath, possible pulmonary disease.

                     

 

MEDICAL HISTORY :     

Hypercholesterolemia.  Hypertension        

 

SURGICAL HISTORY :     

Lobectomy.   

 

ENCOUNTER:     

Subsequent                                        

 

ACUITY:     

3 days      

 

PAIN SCORE:     

6/10

LOCATION:     Left chest 

 

FINDINGS:     

Single AP view of the chest. Left-sided chest tube remains in place. No evidence of pneumothorax. The
 lungs are clear. Cardiomediastinal silhouette within normal limits. No evidence of pleural effusion 
or pneumothorax.

 

CONCLUSION: Left-sided chest tube in place. No evidence of pneumothorax. 

 

 

 Juan Pablo Kathleen MD on June 18, 2017 at 6:37           

Board Certified Radiologist.

 This report was verified electronically.

## 2017-06-19 VITALS
TEMPERATURE: 98.4 F | DIASTOLIC BLOOD PRESSURE: 82 MMHG | RESPIRATION RATE: 16 BRPM | HEART RATE: 90 BPM | SYSTOLIC BLOOD PRESSURE: 140 MMHG | OXYGEN SATURATION: 95 %

## 2017-06-19 VITALS — HEART RATE: 94 BPM

## 2017-06-19 VITALS
TEMPERATURE: 97.2 F | SYSTOLIC BLOOD PRESSURE: 165 MMHG | RESPIRATION RATE: 18 BRPM | OXYGEN SATURATION: 97 % | HEART RATE: 92 BPM | DIASTOLIC BLOOD PRESSURE: 89 MMHG

## 2017-06-19 VITALS — HEART RATE: 82 BPM

## 2017-06-19 VITALS
OXYGEN SATURATION: 95 % | TEMPERATURE: 98.7 F | HEART RATE: 84 BPM | SYSTOLIC BLOOD PRESSURE: 123 MMHG | RESPIRATION RATE: 16 BRPM | DIASTOLIC BLOOD PRESSURE: 68 MMHG

## 2017-06-19 VITALS — HEART RATE: 84 BPM

## 2017-06-19 VITALS
OXYGEN SATURATION: 95 % | DIASTOLIC BLOOD PRESSURE: 82 MMHG | RESPIRATION RATE: 16 BRPM | HEART RATE: 85 BPM | TEMPERATURE: 98.3 F | SYSTOLIC BLOOD PRESSURE: 150 MMHG

## 2017-06-19 VITALS
HEART RATE: 89 BPM | DIASTOLIC BLOOD PRESSURE: 88 MMHG | SYSTOLIC BLOOD PRESSURE: 144 MMHG | OXYGEN SATURATION: 96 % | RESPIRATION RATE: 16 BRPM | TEMPERATURE: 98.8 F

## 2017-06-19 VITALS — HEART RATE: 88 BPM

## 2017-06-19 VITALS
RESPIRATION RATE: 16 BRPM | HEART RATE: 84 BPM | SYSTOLIC BLOOD PRESSURE: 128 MMHG | OXYGEN SATURATION: 95 % | TEMPERATURE: 98.2 F | DIASTOLIC BLOOD PRESSURE: 69 MMHG

## 2017-06-19 VITALS — HEART RATE: 90 BPM

## 2017-06-19 VITALS — HEART RATE: 99 BPM

## 2017-06-19 VITALS — HEART RATE: 92 BPM

## 2017-06-19 VITALS — HEART RATE: 80 BPM

## 2017-06-19 VITALS — HEART RATE: 98 BPM

## 2017-06-19 VITALS — HEART RATE: 76 BPM

## 2017-06-19 VITALS — HEART RATE: 78 BPM

## 2017-06-19 VITALS — HEART RATE: 74 BPM

## 2017-06-19 VITALS — OXYGEN SATURATION: 96 %

## 2017-06-19 RX ADMIN — ALBUTEROL SULFATE SCH MG: 2.5 SOLUTION RESPIRATORY (INHALATION) at 21:26

## 2017-06-19 RX ADMIN — DOCUSATE CALCIUM SCH MG: 240 CAPSULE, LIQUID FILLED ORAL at 19:46

## 2017-06-19 RX ADMIN — ALBUTEROL SULFATE SCH MG: 2.5 SOLUTION RESPIRATORY (INHALATION) at 03:26

## 2017-06-19 RX ADMIN — CETIRIZINE HYDROCHLORIDE SCH MG: 10 TABLET, FILM COATED ORAL at 08:40

## 2017-06-19 RX ADMIN — ATORVASTATIN CALCIUM SCH MG: 80 TABLET, FILM COATED ORAL at 19:46

## 2017-06-19 RX ADMIN — PANTOPRAZOLE SCH MG: 40 TABLET, DELAYED RELEASE ORAL at 19:46

## 2017-06-19 RX ADMIN — SODIUM CHLORIDE, PRESERVATIVE FREE SCH ML: 5 INJECTION INTRAVENOUS at 21:00

## 2017-06-19 RX ADMIN — HYDROCODONE BITARTRATE AND ACETAMINOPHEN PRN TAB: 5; 325 TABLET ORAL at 22:50

## 2017-06-19 RX ADMIN — ALBUTEROL SULFATE SCH MG: 2.5 SOLUTION RESPIRATORY (INHALATION) at 10:00

## 2017-06-19 RX ADMIN — HYDROCODONE BITARTRATE AND ACETAMINOPHEN PRN TAB: 5; 325 TABLET ORAL at 15:37

## 2017-06-19 RX ADMIN — HYDROCODONE BITARTRATE AND ACETAMINOPHEN PRN TAB: 5; 325 TABLET ORAL at 00:06

## 2017-06-19 RX ADMIN — SODIUM CHLORIDE, PRESERVATIVE FREE SCH ML: 5 INJECTION INTRAVENOUS at 08:41

## 2017-06-19 RX ADMIN — HYDROCODONE BITARTRATE AND ACETAMINOPHEN PRN TAB: 5; 325 TABLET ORAL at 19:47

## 2017-06-19 RX ADMIN — HYDROCODONE BITARTRATE AND ACETAMINOPHEN PRN TAB: 5; 325 TABLET ORAL at 11:29

## 2017-06-19 RX ADMIN — CLONIDINE HYDROCHLORIDE PRN MG: 0.1 INJECTION, SOLUTION EPIDURAL at 21:31

## 2017-06-19 RX ADMIN — LISINOPRIL SCH MG: 20 TABLET ORAL at 21:32

## 2017-06-19 RX ADMIN — ALBUTEROL SULFATE SCH MG: 2.5 SOLUTION RESPIRATORY (INHALATION) at 15:14

## 2017-06-19 RX ADMIN — HYDROCODONE BITARTRATE AND ACETAMINOPHEN PRN TAB: 5; 325 TABLET ORAL at 08:40

## 2017-06-19 RX ADMIN — HYDROCODONE BITARTRATE AND ACETAMINOPHEN PRN TAB: 5; 325 TABLET ORAL at 04:47

## 2017-06-19 NOTE — PD.CAR.PN
CVT Progress Note


Subjective/Hospital Course:


66 yo pleasant lady with synchronous lung malignancies





6/16 


PROCEDURES 


1. Robotic Left Upper Lobectomy


2. Mediastinal Lymph Node Dissection. 


3. Intercostal Nerve Block





6/17


Doing well


Air-leak with valsalva


Maintain CT to water seal


Ambulate





6/18


Doing  great


No discernible air-leak


Keep CT


re-evaluate in am for removal


Pathology pending





6/19


small intermittent air leak 


minimal drainage 


continue pain control


pulm toileting


Objective:





 Vital Signs








  Date Time  Temp Pulse Resp B/P Pulse Ox O2 Delivery O2 Flow Rate FiO2


 


6/19/17 12:18   16     


 


6/19/17 12:00 98.7 84 16 123/68 95   


 


6/19/17 12:00  89      


 


6/19/17 11:00  92      


 


6/19/17 10:00  82      


 


6/19/17 09:00  88      


 


6/19/17 08:00 98.8 89 16 144/88 96   


 


6/19/17 08:00  74      


 


6/19/17 07:00  88      


 


6/19/17 06:00  98      


 


6/19/17 05:12 98.4 90 16 140/82 95   


 


6/19/17 05:00  84      


 


6/19/17 04:00  76      


 


6/19/17 03:00  74      


 


6/19/17 02:00  76      


 


6/19/17 01:00  78      


 


6/19/17 00:11 98.2 84 16 128/69 95   


 


6/19/17 00:00  84      


 


6/18/17 23:00  79      


 


6/18/17 22:00  82      


 


6/18/17 21:00  94      


 


6/18/17 20:00  84      


 


6/18/17 20:00 98.5 89 16 125/72 96   


 


6/18/17 19:00  80      


 


6/18/17 18:00  78      


 


6/18/17 17:29   20     


 


6/18/17 17:00  82      


 


6/18/17 16:00  81      


 


6/18/17 15:00 97.8 87 20 144/81 96   


 


6/18/17 15:00  79      


 


6/18/17 14:00  101      








Result Diagram:  


6/17/17 0507                                                                   

             6/17/17 0507





Telemetry:


NSR





(1) Hyperlipemia


(2) Hypertension


Plan:  home meds 





(3) right vats 


(4) Lung cancer


Plan:  await path 





(5) COPD (chronic obstructive pulmonary disease)


Plan:  nebs , ezpap acapella Terwilliger,Jacqueline R. ARNP Jun 19, 2017 13:38

## 2017-06-20 VITALS — HEART RATE: 88 BPM

## 2017-06-20 VITALS
SYSTOLIC BLOOD PRESSURE: 139 MMHG | RESPIRATION RATE: 18 BRPM | OXYGEN SATURATION: 95 % | HEART RATE: 93 BPM | DIASTOLIC BLOOD PRESSURE: 80 MMHG | TEMPERATURE: 98.6 F

## 2017-06-20 VITALS
OXYGEN SATURATION: 94 % | HEART RATE: 83 BPM | DIASTOLIC BLOOD PRESSURE: 73 MMHG | TEMPERATURE: 98.4 F | RESPIRATION RATE: 20 BRPM | SYSTOLIC BLOOD PRESSURE: 132 MMHG

## 2017-06-20 VITALS
RESPIRATION RATE: 20 BRPM | OXYGEN SATURATION: 97 % | SYSTOLIC BLOOD PRESSURE: 152 MMHG | DIASTOLIC BLOOD PRESSURE: 76 MMHG | HEART RATE: 86 BPM | TEMPERATURE: 97.8 F

## 2017-06-20 VITALS
RESPIRATION RATE: 18 BRPM | DIASTOLIC BLOOD PRESSURE: 85 MMHG | SYSTOLIC BLOOD PRESSURE: 144 MMHG | HEART RATE: 95 BPM | TEMPERATURE: 99.5 F | OXYGEN SATURATION: 97 %

## 2017-06-20 VITALS — HEART RATE: 76 BPM

## 2017-06-20 VITALS
TEMPERATURE: 97.2 F | HEART RATE: 90 BPM | SYSTOLIC BLOOD PRESSURE: 126 MMHG | DIASTOLIC BLOOD PRESSURE: 74 MMHG | RESPIRATION RATE: 16 BRPM | OXYGEN SATURATION: 93 %

## 2017-06-20 VITALS — HEART RATE: 72 BPM

## 2017-06-20 VITALS — OXYGEN SATURATION: 95 %

## 2017-06-20 VITALS
RESPIRATION RATE: 14 BRPM | SYSTOLIC BLOOD PRESSURE: 132 MMHG | TEMPERATURE: 98 F | DIASTOLIC BLOOD PRESSURE: 72 MMHG | OXYGEN SATURATION: 93 % | HEART RATE: 94 BPM

## 2017-06-20 VITALS — HEART RATE: 82 BPM

## 2017-06-20 VITALS
TEMPERATURE: 97.9 F | HEART RATE: 87 BPM | RESPIRATION RATE: 20 BRPM | SYSTOLIC BLOOD PRESSURE: 124 MMHG | OXYGEN SATURATION: 97 % | DIASTOLIC BLOOD PRESSURE: 71 MMHG

## 2017-06-20 VITALS — HEART RATE: 95 BPM

## 2017-06-20 VITALS — HEART RATE: 90 BPM

## 2017-06-20 VITALS — HEART RATE: 100 BPM

## 2017-06-20 VITALS — HEART RATE: 84 BPM

## 2017-06-20 VITALS — HEART RATE: 93 BPM

## 2017-06-20 VITALS — HEART RATE: 85 BPM

## 2017-06-20 RX ADMIN — ALBUTEROL SULFATE SCH MG: 2.5 SOLUTION RESPIRATORY (INHALATION) at 15:47

## 2017-06-20 RX ADMIN — ALBUTEROL SULFATE SCH MG: 2.5 SOLUTION RESPIRATORY (INHALATION) at 09:36

## 2017-06-20 RX ADMIN — CLONIDINE HYDROCHLORIDE PRN MG: 0.1 INJECTION, SOLUTION EPIDURAL at 04:36

## 2017-06-20 RX ADMIN — HYDROCODONE BITARTRATE AND ACETAMINOPHEN PRN TAB: 5; 325 TABLET ORAL at 23:01

## 2017-06-20 RX ADMIN — DOCUSATE CALCIUM SCH MG: 240 CAPSULE, LIQUID FILLED ORAL at 20:58

## 2017-06-20 RX ADMIN — SODIUM CHLORIDE, PRESERVATIVE FREE SCH ML: 5 INJECTION INTRAVENOUS at 21:00

## 2017-06-20 RX ADMIN — HYDROCODONE BITARTRATE AND ACETAMINOPHEN PRN TAB: 5; 325 TABLET ORAL at 07:08

## 2017-06-20 RX ADMIN — CETIRIZINE HYDROCHLORIDE SCH MG: 10 TABLET, FILM COATED ORAL at 09:24

## 2017-06-20 RX ADMIN — ALBUTEROL SULFATE SCH MG: 2.5 SOLUTION RESPIRATORY (INHALATION) at 03:44

## 2017-06-20 RX ADMIN — HYDROCODONE BITARTRATE AND ACETAMINOPHEN PRN TAB: 5; 325 TABLET ORAL at 14:38

## 2017-06-20 RX ADMIN — SODIUM CHLORIDE, PRESERVATIVE FREE SCH ML: 5 INJECTION INTRAVENOUS at 09:00

## 2017-06-20 RX ADMIN — LISINOPRIL SCH MG: 20 TABLET ORAL at 09:24

## 2017-06-20 RX ADMIN — HYDROCODONE BITARTRATE AND ACETAMINOPHEN PRN TAB: 5; 325 TABLET ORAL at 11:35

## 2017-06-20 RX ADMIN — ATORVASTATIN CALCIUM SCH MG: 80 TABLET, FILM COATED ORAL at 20:57

## 2017-06-20 RX ADMIN — LISINOPRIL SCH MG: 20 TABLET ORAL at 20:58

## 2017-06-20 RX ADMIN — PANTOPRAZOLE SCH MG: 40 TABLET, DELAYED RELEASE ORAL at 20:58

## 2017-06-20 NOTE — RADRPT
EXAM DATE/TIME:  06/20/2017 03:31 

 

[facility] COMPARISON:     

CHEST SINGLE AP, June 18, 2017, 5:58.

 

                     

INDICATIONS :     

Shortness of breath.

                     

 

MEDICAL HISTORY :            

Hypercholesterolemia. Hypertension.   

 

SURGICAL HISTORY :        

Lobectomy.

 

ENCOUNTER:     

Subsequent                                        

 

ACUITY:     

1 week      

 

PAIN SCORE:     

0/10

 

LOCATION:     

Bilateral  chest

 

FINDINGS:     

No infiltrate, effusion or pneumothorax demonstrated. Left chest tube remains in place. Mild left vol
ume loss again noted, unchanged.

 

Heart size stable, within normal limits. Tortuous and atherosclerotic aorta again noted.

 

CONCLUSION:     No significant change.

 

 

 

 Marc Ramírez MD on June 20, 2017 at 4:47           

Board Certified Radiologist.

 This report was verified electronically.

## 2017-06-20 NOTE — RADRPT
EXAM DATE/TIME:  06/20/2017 12:02 

 

[facility] COMPARISON:     

CHEST SINGLE AP, June 20, 2017, 3:31.

 

                     

INDICATIONS :     

Pneumothorax.  

                     

 

MEDICAL HISTORY :            

Chronic obstructive pulmonary disease. Osteoporosis. Hypertension. Heart valve disease. Left parotid 
gland mass. Left cervical lymphadenopathy. Squamous cell carcinoma. Left upper lobe lung nodule, caitie
ocarcinoma. Coronary artery disease.   

 

SURGICAL HISTORY :        

Coronary artery stent. Hysterectomy. Cataract removal. Ovarian cyst removal. Biopsy of left upper lob
e nodule. VATs procedure with wedge resection. Colonoscopy.

 

ENCOUNTER:     

Subsequent                                        

 

ACUITY:     

3 days      

 

PAIN SCORE:     

3/10

 

LOCATION:     

Bilateral chest 

 

FINDINGS:     

A single view of the chest demonstrates postsurgical changes right upper lung. Left-sided chest tube 
in position, unchanged. Lungs are clear. No consolidation or pneumothorax. Mild volume loss on the le
ft.  Osseous structures are intact.

 

CONCLUSION:     

1. Post surgical changes right upper lung.

2. Left-sided chest tube without pneumothorax.

 

 

 

 Jared Peralta MD on June 20, 2017 at 13:06           

Board Certified Radiologist.

 This report was verified electronically.

## 2017-06-20 NOTE — PD.CAR.PN
CVT Progress Note


Subjective/Hospital Course:


66 yo pleasant lady with synchronous lung malignancies





6/16 


PROCEDURES 


1. Robotic Left Upper Lobectomy


2. Mediastinal Lymph Node Dissection. 


3. Intercostal Nerve Block





6/17


Doing well


Air-leak with valsalva


Maintain CT to water seal


Ambulate





6/18


Doing  great


No discernible air-leak


Keep CT


re-evaluate in am for removal


Pathology pending





6/19


small intermittent air leak 


minimal drainage 


continue pain control


pulm toileting 








6/20


left chest tube removed without difficulty 


on room air , recheck cxr in am 


eval for dc in am


Objective:


GENERAL: 


SKIN: Warm and dry.incisions intact left chest area  


HEAD: Normocephalic.


EYES: No scleral icterus. No injection or drainage. 


NECK: Supple, trachea midline. No JVD or lymphadenopathy.


CARDIOVASCULAR: Regular rate and rhythm without murmurs, gallops, or rubs. 


RESPIRATORY: Breath sounds equal bilaterally. No accessory muscle use.


GASTROINTESTINAL: Abdomen soft, non-tender, nondistended. 


MUSCULOSKELETAL: No cyanosis, or edema. 


BACK: Nontender without obvious deformity. No CVA tenderness.








 Vital Signs








  Date Time  Temp Pulse Resp B/P Pulse Ox O2 Delivery O2 Flow Rate FiO2


 


6/20/17 15:33   18     


 


6/20/17 15:00  95      


 


6/20/17 15:00 98.4 83 20 132/73 94   


 


6/20/17 14:00  82      


 


6/20/17 13:00  88      


 


6/20/17 12:00  100      


 


6/20/17 11:00 97.8 92 20 152/76 97   


 


6/20/17 11:00  86      


 


6/20/17 10:00  84      


 


6/20/17 09:39     95   21


 


6/20/17 09:00  90      


 


6/20/17 08:00  87      


 


6/20/17 08:00 97.9 87 20 124/71 97   


 


6/20/17 07:00  95      


 


6/20/17 04:46 98.0 94 14 132/72 93   


 


6/20/17 04:00  93      


 


6/20/17 03:00  72      


 


6/20/17 02:00  82      


 


6/20/17 01:00  76      


 


6/20/17 00:00 97.2 92 16 126/74 93   


 


6/20/17 00:00  90      


 


6/19/17 23:00  99      


 


6/19/17 22:00  82      


 


6/19/17 21:00  84      


 


6/19/17 20:00 97.2 94 18 165/89 97   


 


6/19/17 20:00  92      


 


6/19/17 19:00  90      


 


6/19/17 18:00  94      


 


6/19/17 17:00  80      


 


6/19/17 16:00  85      


 


6/19/17 16:00 98.3 90 16 150/82 95   








Result Diagram:  


6/17/17 0507                                                                   

             6/17/17 0507





Cardiovascular:


NSR





(1) Hyperlipemia


(2) Hypertension


Plan:  home meds 





(3) right vats 


(4) Lung cancer


Plan:  await path adenocarcinoma 





pT1a pNO


no bronchial margin involved no lymph node involved 





(5) COPD (chronic obstructive pulmonary disease)


Plan:  nebs , ezpap acapella Terwilliger,Jacqueline R. ARNP Jun 20, 2017 16:00

## 2017-06-21 VITALS — HEART RATE: 97 BPM

## 2017-06-21 VITALS
RESPIRATION RATE: 20 BRPM | OXYGEN SATURATION: 95 % | HEART RATE: 85 BPM | TEMPERATURE: 98.1 F | SYSTOLIC BLOOD PRESSURE: 120 MMHG | DIASTOLIC BLOOD PRESSURE: 69 MMHG

## 2017-06-21 VITALS — RESPIRATION RATE: 20 BRPM

## 2017-06-21 VITALS
RESPIRATION RATE: 18 BRPM | OXYGEN SATURATION: 96 % | HEART RATE: 87 BPM | DIASTOLIC BLOOD PRESSURE: 77 MMHG | TEMPERATURE: 98.7 F | SYSTOLIC BLOOD PRESSURE: 134 MMHG

## 2017-06-21 VITALS — HEART RATE: 114 BPM

## 2017-06-21 VITALS — HEART RATE: 88 BPM

## 2017-06-21 VITALS — HEART RATE: 90 BPM

## 2017-06-21 RX ADMIN — HYDROCODONE BITARTRATE AND ACETAMINOPHEN PRN TAB: 5; 325 TABLET ORAL at 10:15

## 2017-06-21 RX ADMIN — SODIUM CHLORIDE, PRESERVATIVE FREE SCH ML: 5 INJECTION INTRAVENOUS at 09:00

## 2017-06-21 RX ADMIN — LISINOPRIL SCH MG: 20 TABLET ORAL at 10:16

## 2017-06-21 RX ADMIN — CETIRIZINE HYDROCHLORIDE SCH MG: 10 TABLET, FILM COATED ORAL at 10:16

## 2017-06-21 NOTE — RADRPT
EXAM DATE/TIME:  06/21/2017 04:49 

 

[facility] COMPARISON:     

No previous studies available for comparison.

 

                     

INDICATIONS :     

Short of breath.

                     

 

MEDICAL HISTORY :            

Chronic obstructive pulmonary disease. Osteoporosis. Hypertension.    

 

SURGICAL HISTORY :     

Lobectomy.   Coronary artery stent.

 

ENCOUNTER:     

Subsequent                                        

 

ACUITY:     

1 week      

 

PAIN SCORE:     

0/10

 

LOCATION:     

Bilateral  chest

 

FINDINGS:     

Left chest tube has been removed. No pneumothorax seen. Mild patchy infiltrate and volume loss again 
seen on the left.

 

Streaky infiltrate of the right upper lobe unchanged.

 

Heart size stable, normal.

 

CONCLUSION:     

Left chest tube removed. No pneumothorax seen. Otherwise no significant change, as above.

 

 

 

 Marc Ramírez MD on June 21, 2017 at 5:46           

Board Certified Radiologist.

 This report was verified electronically.

## 2017-06-21 NOTE — HHI.DS
Discharge Summary


Admission Date


Jun 16, 2017 at 05:38


Discharge Date:  Jun 21, 2017


Admitting Diagnosis


lung mass





(1) COPD (chronic obstructive pulmonary disease)


Diagnosis:  Principal





(2) Lung cancer


Diagnosis:  Principal





(3) Hypertension


Diagnosis:  Principal





(4) right vats 


Diagnosis:  Secondary





Procedures





1. Robotic Left Upper Lobectomy   6/16


2. Mediastinal Lymph Node Dissection. 


3. Intercostal Nerve Block


Brief History


67  female hx of 


 recent diagnosis of left lingular cancer, underwent workup demonstrating 

poorly differentiated adenocarcinoma of left lung,, she also had a second 

smaller right upper lobe nodule that was suspicious for an underlying 

malignancy.


also underwent PET scan/  Right vats with resection of RUL lung cancer 410/17





Left Upper Lobe Adenocarcinoma


. Right Upper Lobe Squamous Cell Carcinoma


Head & Neck Cancer





PMH: adenocarcinoma of lung, anxiety , chronic bronchitis, COPD, HTN, HLP


CBC/BMP:  


6/17/17 0507                                                                   

             6/17/17 0507





Significant Findings


path final diagnosis   benign antracotic lymph node pT1a pNO 


histologic type adenocarcinoma


Imaging





Last Impressions








Chest X-Ray 6/21/17 0600 Signed





Impressions: 





 Service Date/Time:  Wednesday, June 21, 2017 04:49 - CONCLUSION:  Left chest 





 tube removed. No pneumothorax seen. Otherwise no significant change, as above.

   





   Marc Ramírez MD 








PE at Discharge


GENERAL: 


SKIN: Warm and dry. incisions intact to left posterior chest wall 


HEAD: Normocephalic.


EYES: No scleral icterus. No injection or drainage. 


NECK: Supple, trachea midline. No JVD or lymphadenopathy.


CARDIOVASCULAR: Regular rate and rhythm without murmurs, gallops, or rubs. 


RESPIRATORY: Breath sounds equal bilaterally. No accessory muscle use.


GASTROINTESTINAL: Abdomen soft, non-tender, nondistended. 


MUSCULOSKELETAL: No cyanosis, or edema. 


BACK: Nontender without obvious deformity. No CVA tenderness.





Hospital Course


6/16 


PROCEDURES 


1. Robotic Left Upper Lobectomy


2. Mediastinal Lymph Node Dissection. 


3. Intercostal Nerve Block





6/17


Doing well


Air-leak with valsalva


Maintain CT to water seal


Ambulate





6/18


Doing  great


No discernible air-leak


Keep CT


re-evaluate in am for removal


Pathology pending





6/19


small intermittent air leak 


minimal drainage 


continue pain control


pulm toileting 








6/20


left chest tube removed without difficulty 


on room air , recheck cxr in am 


eval for dc in am





6/21 cxr no evidence of PTX, stable for dc 


F/U with oncology and pulmonology 


discharge instructions given to pt


Pt Condition on Discharge:  Good


Discharge Disposition:  Disch w/ Home Health Serv


Discharge Instructions


DIET: Follow Instructions for:  As Tolerated, No Restrictions


Activities you can perform:  Full Weight Bearing, Shower Only-No Bath


Activities to avoid:  Driving


Additional Activity Instructio:  


no liftinjg > 8 lbs or gallon of milk


Follow up Referrals:  


Oncology - 4 Weeks with Aiden Atkins MD


PCP Follow-up


Pulmonology with JOSE Partida MD


Surgical with Israel Mead MD





New Medications:  


Docusate Calcium (Sm Stool Softener) 240 Mg Cap


240 MG PO HS Constipation #30 Ref 0 CAP


Hydrocodone-Acetaminophen (Hydrocodone-Acetaminophen) 5-325 mg Tab


1 TAB PO Q4HR PRN PAIN SCALE 3 TO 5 #40 Ref 0 TAB


 


Continued Medications:  


Albuterol Neb (Albuterol Neb) 2.5 Mg/3 Ml Neb


2.5 MG NEB TID NEB PRN SHORTNESS OF BREATH #60 Ref 0 NEBULE


Alendronate (Alendronate) 70 Mg Tab


70 MG PO Q7D Osteporosis Treatment #4 Ref 0 TAB


Amlodipine (Amlodipine) 5 Mg Tab


5 MG PO DAILY Blood Pressure Management #30 Ref 0 TAB


Atorvastatin (Atorvastatin) 80 Mg Tab


80 MG PO HS Cholesterol Management #30 Ref 0 TAB


Calcium Carbonate-Cholecalciferol (Calcium) 1,250-100 Mg-Unit Chew


1 TAB CHEW DAILY TAB


Cetirizine HCl (Zyrtec) 10 Mg Tablet


1 TAB PO DAILY


Glucosamine (Glucosamine) 500 Mg Cap


500 MG PO DAILY Herbal Supplements Ref 0 CAP


Lisinopril (Lisinopril) 40 Mg Tab


40 MG PO BID Blood Pressure Management #30 Ref 0 TAB


Multiple Vitamins W/ Minerals (Multi For Her 50+) 1 Tab Tab


1 TAB PO DAILY Nutritional Supplement











Terwilliger,Jacqueline R. ARNP Jun 21, 2017 10:30

## 2018-03-27 ENCOUNTER — HOSPITAL ENCOUNTER (OUTPATIENT)
Dept: HOSPITAL 17 - NEPE | Age: 68
Setting detail: OBSERVATION
LOS: 8 days | Discharge: HOME | End: 2018-04-04
Attending: HOSPITALIST | Admitting: HOSPITALIST
Payer: MEDICARE

## 2018-03-27 VITALS
HEART RATE: 82 BPM | RESPIRATION RATE: 16 BRPM | TEMPERATURE: 98.1 F | DIASTOLIC BLOOD PRESSURE: 69 MMHG | SYSTOLIC BLOOD PRESSURE: 154 MMHG

## 2018-03-27 DIAGNOSIS — R79.89: ICD-10-CM

## 2018-03-27 DIAGNOSIS — I10: ICD-10-CM

## 2018-03-27 DIAGNOSIS — R11.2: ICD-10-CM

## 2018-03-27 DIAGNOSIS — K55.9: ICD-10-CM

## 2018-03-27 DIAGNOSIS — R10.31: ICD-10-CM

## 2018-03-27 DIAGNOSIS — D62: ICD-10-CM

## 2018-03-27 DIAGNOSIS — I49.3: ICD-10-CM

## 2018-03-27 DIAGNOSIS — E78.00: ICD-10-CM

## 2018-03-27 DIAGNOSIS — K29.70: ICD-10-CM

## 2018-03-27 DIAGNOSIS — K62.1: ICD-10-CM

## 2018-03-27 DIAGNOSIS — K56.2: Primary | ICD-10-CM

## 2018-03-27 DIAGNOSIS — D12.3: ICD-10-CM

## 2018-03-27 DIAGNOSIS — K59.00: ICD-10-CM

## 2018-03-27 DIAGNOSIS — Z90.2: ICD-10-CM

## 2018-03-27 DIAGNOSIS — Z85.118: ICD-10-CM

## 2018-03-27 DIAGNOSIS — J44.9: ICD-10-CM

## 2018-03-27 DIAGNOSIS — F17.210: ICD-10-CM

## 2018-03-27 DIAGNOSIS — Z95.5: ICD-10-CM

## 2018-03-27 DIAGNOSIS — R73.9: ICD-10-CM

## 2018-03-27 DIAGNOSIS — R14.0: ICD-10-CM

## 2018-03-27 DIAGNOSIS — Z79.899: ICD-10-CM

## 2018-03-27 DIAGNOSIS — F41.9: ICD-10-CM

## 2018-03-27 DIAGNOSIS — D72.829: ICD-10-CM

## 2018-03-27 DIAGNOSIS — M81.0: ICD-10-CM

## 2018-03-27 PROCEDURE — 81001 URINALYSIS AUTO W/SCOPE: CPT

## 2018-03-27 PROCEDURE — 71045 X-RAY EXAM CHEST 1 VIEW: CPT

## 2018-03-27 PROCEDURE — 44160 REMOVAL OF COLON: CPT

## 2018-03-27 PROCEDURE — 00813 ANES UPR LWR GI NDSC PX: CPT

## 2018-03-27 PROCEDURE — 80053 COMPREHEN METABOLIC PANEL: CPT

## 2018-03-27 PROCEDURE — 96374 THER/PROPH/DIAG INJ IV PUSH: CPT

## 2018-03-27 PROCEDURE — 85027 COMPLETE CBC AUTOMATED: CPT

## 2018-03-27 PROCEDURE — 83690 ASSAY OF LIPASE: CPT

## 2018-03-27 PROCEDURE — 96372 THER/PROPH/DIAG INJ SC/IM: CPT

## 2018-03-27 PROCEDURE — 84100 ASSAY OF PHOSPHORUS: CPT

## 2018-03-27 PROCEDURE — 80048 BASIC METABOLIC PNL TOTAL CA: CPT

## 2018-03-27 PROCEDURE — 74177 CT ABD & PELVIS W/CONTRAST: CPT

## 2018-03-27 PROCEDURE — 94150 VITAL CAPACITY TEST: CPT

## 2018-03-27 PROCEDURE — 96375 TX/PRO/DX INJ NEW DRUG ADDON: CPT

## 2018-03-27 PROCEDURE — 88312 SPECIAL STAINS GROUP 1: CPT

## 2018-03-27 PROCEDURE — 45385 COLONOSCOPY W/LESION REMOVAL: CPT

## 2018-03-27 PROCEDURE — 85610 PROTHROMBIN TIME: CPT

## 2018-03-27 PROCEDURE — 96361 HYDRATE IV INFUSION ADD-ON: CPT

## 2018-03-27 PROCEDURE — 88307 TISSUE EXAM BY PATHOLOGIST: CPT

## 2018-03-27 PROCEDURE — 87493 C DIFF AMPLIFIED PROBE: CPT

## 2018-03-27 PROCEDURE — 83605 ASSAY OF LACTIC ACID: CPT

## 2018-03-27 PROCEDURE — 85730 THROMBOPLASTIN TIME PARTIAL: CPT

## 2018-03-27 PROCEDURE — 96365 THER/PROPH/DIAG IV INF INIT: CPT

## 2018-03-27 PROCEDURE — 84155 ASSAY OF PROTEIN SERUM: CPT

## 2018-03-27 PROCEDURE — 96376 TX/PRO/DX INJ SAME DRUG ADON: CPT

## 2018-03-27 PROCEDURE — 96366 THER/PROPH/DIAG IV INF ADDON: CPT

## 2018-03-27 PROCEDURE — 87116 MYCOBACTERIA CULTURE: CPT

## 2018-03-27 PROCEDURE — 86900 BLOOD TYPING SEROLOGIC ABO: CPT

## 2018-03-27 PROCEDURE — 93005 ELECTROCARDIOGRAM TRACING: CPT

## 2018-03-27 PROCEDURE — 87205 SMEAR GRAM STAIN: CPT

## 2018-03-27 PROCEDURE — 87070 CULTURE OTHR SPECIMN AEROBIC: CPT

## 2018-03-27 PROCEDURE — 97163 PT EVAL HIGH COMPLEX 45 MIN: CPT

## 2018-03-27 PROCEDURE — G0378 HOSPITAL OBSERVATION PER HR: HCPCS

## 2018-03-27 PROCEDURE — 74018 RADEX ABDOMEN 1 VIEW: CPT

## 2018-03-27 PROCEDURE — 82948 REAGENT STRIP/BLOOD GLUCOSE: CPT

## 2018-03-27 PROCEDURE — 83735 ASSAY OF MAGNESIUM: CPT

## 2018-03-27 PROCEDURE — 43239 EGD BIOPSY SINGLE/MULTIPLE: CPT

## 2018-03-27 PROCEDURE — 88305 TISSUE EXAM BY PATHOLOGIST: CPT

## 2018-03-27 PROCEDURE — 96367 TX/PROPH/DG ADDL SEQ IV INF: CPT

## 2018-03-27 PROCEDURE — 94667 MNPJ CHEST WALL 1ST: CPT

## 2018-03-27 PROCEDURE — 94664 DEMO&/EVAL PT USE INHALER: CPT

## 2018-03-27 PROCEDURE — 96368 THER/DIAG CONCURRENT INF: CPT

## 2018-03-27 PROCEDURE — 84132 ASSAY OF SERUM POTASSIUM: CPT

## 2018-03-27 PROCEDURE — 88309 TISSUE EXAM BY PATHOLOGIST: CPT

## 2018-03-27 PROCEDURE — 85025 COMPLETE CBC W/AUTO DIFF WBC: CPT

## 2018-03-27 PROCEDURE — 87015 SPECIMEN INFECT AGNT CONCNTJ: CPT

## 2018-03-27 PROCEDURE — 94668 MNPJ CHEST WALL SBSQ: CPT

## 2018-03-27 PROCEDURE — 85007 BL SMEAR W/DIFF WBC COUNT: CPT

## 2018-03-27 PROCEDURE — 94640 AIRWAY INHALATION TREATMENT: CPT

## 2018-03-27 PROCEDURE — 86850 RBC ANTIBODY SCREEN: CPT

## 2018-03-27 PROCEDURE — 83036 HEMOGLOBIN GLYCOSYLATED A1C: CPT

## 2018-03-27 PROCEDURE — 86901 BLOOD TYPING SEROLOGIC RH(D): CPT

## 2018-03-27 PROCEDURE — 94618 PULMONARY STRESS TESTING: CPT

## 2018-03-27 PROCEDURE — 87206 SMEAR FLUORESCENT/ACID STAI: CPT

## 2018-03-27 PROCEDURE — 99285 EMERGENCY DEPT VISIT HI MDM: CPT

## 2018-03-28 VITALS
SYSTOLIC BLOOD PRESSURE: 134 MMHG | RESPIRATION RATE: 16 BRPM | OXYGEN SATURATION: 98 % | DIASTOLIC BLOOD PRESSURE: 69 MMHG | HEART RATE: 89 BPM

## 2018-03-28 VITALS
OXYGEN SATURATION: 98 % | SYSTOLIC BLOOD PRESSURE: 125 MMHG | DIASTOLIC BLOOD PRESSURE: 66 MMHG | RESPIRATION RATE: 16 BRPM | HEART RATE: 99 BPM

## 2018-03-28 VITALS
HEART RATE: 96 BPM | SYSTOLIC BLOOD PRESSURE: 119 MMHG | RESPIRATION RATE: 16 BRPM | DIASTOLIC BLOOD PRESSURE: 69 MMHG | OXYGEN SATURATION: 97 %

## 2018-03-28 VITALS
DIASTOLIC BLOOD PRESSURE: 68 MMHG | TEMPERATURE: 98.1 F | OXYGEN SATURATION: 98 % | SYSTOLIC BLOOD PRESSURE: 125 MMHG | HEART RATE: 96 BPM | RESPIRATION RATE: 16 BRPM

## 2018-03-28 VITALS
RESPIRATION RATE: 16 BRPM | OXYGEN SATURATION: 98 % | HEART RATE: 94 BPM | SYSTOLIC BLOOD PRESSURE: 148 MMHG | DIASTOLIC BLOOD PRESSURE: 75 MMHG

## 2018-03-28 VITALS
DIASTOLIC BLOOD PRESSURE: 53 MMHG | RESPIRATION RATE: 16 BRPM | HEART RATE: 96 BPM | OXYGEN SATURATION: 97 % | SYSTOLIC BLOOD PRESSURE: 108 MMHG

## 2018-03-28 VITALS
HEART RATE: 88 BPM | RESPIRATION RATE: 16 BRPM | SYSTOLIC BLOOD PRESSURE: 97 MMHG | OXYGEN SATURATION: 98 % | DIASTOLIC BLOOD PRESSURE: 50 MMHG

## 2018-03-28 VITALS
DIASTOLIC BLOOD PRESSURE: 65 MMHG | SYSTOLIC BLOOD PRESSURE: 129 MMHG | OXYGEN SATURATION: 98 % | RESPIRATION RATE: 16 BRPM | HEART RATE: 79 BPM

## 2018-03-28 VITALS
HEART RATE: 79 BPM | SYSTOLIC BLOOD PRESSURE: 108 MMHG | OXYGEN SATURATION: 98 % | RESPIRATION RATE: 16 BRPM | DIASTOLIC BLOOD PRESSURE: 58 MMHG

## 2018-03-28 VITALS
OXYGEN SATURATION: 92 % | SYSTOLIC BLOOD PRESSURE: 165 MMHG | HEART RATE: 105 BPM | DIASTOLIC BLOOD PRESSURE: 81 MMHG | TEMPERATURE: 98.2 F | RESPIRATION RATE: 16 BRPM

## 2018-03-28 VITALS
RESPIRATION RATE: 17 BRPM | SYSTOLIC BLOOD PRESSURE: 101 MMHG | HEART RATE: 79 BPM | OXYGEN SATURATION: 96 % | DIASTOLIC BLOOD PRESSURE: 59 MMHG

## 2018-03-28 VITALS — OXYGEN SATURATION: 98 %

## 2018-03-28 LAB
ALBUMIN SERPL-MCNC: 3.3 GM/DL (ref 3.4–5)
ALP SERPL-CCNC: 61 U/L (ref 45–117)
ALT SERPL-CCNC: 29 U/L (ref 10–53)
AMORPHOUS SEDIMENT, URINE: (no result)
AST SERPL-CCNC: 34 U/L (ref 15–37)
BASOPHILS # BLD AUTO: 0.1 TH/MM3 (ref 0–0.2)
BASOPHILS NFR BLD: 0.9 % (ref 0–2)
BILIRUB SERPL-MCNC: 0.4 MG/DL (ref 0.2–1)
BUN SERPL-MCNC: 10 MG/DL (ref 7–18)
CALCIUM SERPL-MCNC: 8.2 MG/DL (ref 8.5–10.1)
CHLORIDE SERPL-SCNC: 108 MEQ/L (ref 98–107)
COLOR UR: YELLOW
CREAT SERPL-MCNC: 0.74 MG/DL (ref 0.5–1)
EOSINOPHIL # BLD: 0.2 TH/MM3 (ref 0–0.4)
EOSINOPHIL NFR BLD: 1.8 % (ref 0–4)
ERYTHROCYTE [DISTWIDTH] IN BLOOD BY AUTOMATED COUNT: 14.4 % (ref 11.6–17.2)
GFR SERPLBLD BASED ON 1.73 SQ M-ARVRAT: 79 ML/MIN (ref 89–?)
GLUCOSE SERPL-MCNC: 93 MG/DL (ref 74–106)
GLUCOSE UR STRIP-MCNC: (no result) MG/DL
HCO3 BLD-SCNC: 24.4 MEQ/L (ref 21–32)
HCT VFR BLD CALC: 36.1 % (ref 35–46)
HGB BLD-MCNC: 12 GM/DL (ref 11.6–15.3)
HGB UR QL STRIP: (no result)
INR PPP: 1 RATIO
KETONES UR STRIP-MCNC: 10 MG/DL
LYMPHOCYTES # BLD AUTO: 2 TH/MM3 (ref 1–4.8)
LYMPHOCYTES NFR BLD AUTO: 16.7 % (ref 9–44)
MCH RBC QN AUTO: 29.5 PG (ref 27–34)
MCHC RBC AUTO-ENTMCNC: 33.2 % (ref 32–36)
MCV RBC AUTO: 89.1 FL (ref 80–100)
MONOCYTE #: 0.7 TH/MM3 (ref 0–0.9)
MONOCYTES NFR BLD: 5.6 % (ref 0–8)
MUCOUS THREADS #/AREA URNS LPF: (no result) /LPF
NEUTROPHILS # BLD AUTO: 9 TH/MM3 (ref 1.8–7.7)
NEUTROPHILS NFR BLD AUTO: 75 % (ref 16–70)
NITRITE UR QL STRIP: (no result)
PLATELET # BLD: 273 TH/MM3 (ref 150–450)
PMV BLD AUTO: 7.3 FL (ref 7–11)
PROT SERPL-MCNC: 6.7 GM/DL (ref 6.4–8.2)
PROTHROMBIN TIME: 10.1 SEC (ref 9.8–11.6)
RBC # BLD AUTO: 4.05 MIL/MM3 (ref 4–5.3)
SODIUM SERPL-SCNC: 140 MEQ/L (ref 136–145)
SP GR UR STRIP: 1.01 (ref 1–1.03)
SQUAMOUS #/AREA URNS HPF: 3 /HPF (ref 0–5)
URINE LEUKOCYTE ESTERASE: (no result)
WBC # BLD AUTO: 12.1 TH/MM3 (ref 4–11)

## 2018-03-28 RX ADMIN — HYDROMORPHONE HYDROCHLORIDE PRN MG: 2 INJECTION INTRAMUSCULAR; INTRAVENOUS; SUBCUTANEOUS at 07:57

## 2018-03-28 RX ADMIN — ATORVASTATIN CALCIUM SCH MG: 80 TABLET, FILM COATED ORAL at 22:01

## 2018-03-28 RX ADMIN — PHENYTOIN SODIUM SCH MLS/HR: 50 INJECTION INTRAMUSCULAR; INTRAVENOUS at 20:53

## 2018-03-28 RX ADMIN — LISINOPRIL SCH MG: 20 TABLET ORAL at 13:43

## 2018-03-28 RX ADMIN — PHENYTOIN SODIUM SCH MLS/HR: 50 INJECTION INTRAMUSCULAR; INTRAVENOUS at 11:19

## 2018-03-28 RX ADMIN — ONDANSETRON PRN MG: 2 INJECTION, SOLUTION INTRAMUSCULAR; INTRAVENOUS at 20:58

## 2018-03-28 RX ADMIN — HYDROMORPHONE HYDROCHLORIDE PRN MG: 2 INJECTION INTRAMUSCULAR; INTRAVENOUS; SUBCUTANEOUS at 15:03

## 2018-03-28 RX ADMIN — LISINOPRIL SCH MG: 20 TABLET ORAL at 22:02

## 2018-03-28 RX ADMIN — Medication SCH ML: at 20:58

## 2018-03-28 RX ADMIN — STANDARDIZED SENNA CONCENTRATE AND DOCUSATE SODIUM SCH TAB: 8.6; 5 TABLET, FILM COATED ORAL at 22:01

## 2018-03-28 RX ADMIN — HYDROMORPHONE HYDROCHLORIDE PRN MG: 2 INJECTION INTRAMUSCULAR; INTRAVENOUS; SUBCUTANEOUS at 20:58

## 2018-03-28 RX ADMIN — CETIRIZINE HYDROCHLORIDE SCH MG: 10 TABLET, FILM COATED ORAL at 13:42

## 2018-03-28 RX ADMIN — MULTIPLE VITAMINS W/ MINERALS TAB SCH TAB: TAB at 13:43

## 2018-03-28 RX ADMIN — Medication SCH ML: at 09:00

## 2018-03-28 RX ADMIN — Medication SCH ML: at 02:30

## 2018-03-28 NOTE — RADRPT
EXAM DATE/TIME:  03/28/2018 00:02 

 

[facility] COMPARISON:     

No previous studies available for comparison.

 

                     

INDICATIONS :     

Right lower quadrant abdominal pain. 

                     

 

MEDICAL HISTORY :            

Chronic obstructive pulmonary disease. Osteoporosis. Hypertension. Heart valve disease, Left parotid 
gland mass. Left cervical lymphadenopathy. Squamous cellcarcinoma. Left upper lobe lung nodule, Adeno
carcinoma. Coronary artery disease     

 

SURGICAL HISTORY :        

Coronary artery stent. Hysterectomy. Cataract removal. Ovarian cyst removal,Biopsy of left upper lobe
 nodule. VATs procedure with wedge resection. Colonoscopy 

 

ENCOUNTER:     

Initial                                        

 

ACUITY:     

1 day      

 

PAIN SCORE:     

8/10

 

LOCATION:     

Right lower quadrant abdomen

 

FINDINGS:     

Supine view of the abdomen was performed. Mild gaseous distention of bowel loops in the right abdomen
 but no obstruction. Round calcification the left of L4 vertebral body..  No abnormal masses, calcifi
cations, or organomegaly is seen.  The osseous structures are unremarkable.

 

CONCLUSION:     

Mild gaseous distention of bowel loops in right abdomen without obstruction. Round benign-appearing c
alcification to the left of L4 vertebral body.

 

 

 

 Jared Peralta MD on March 28, 2018 at 0:18           

Board Certified Radiologist.

 This report was verified electronically.

## 2018-03-28 NOTE — PD
HPI


.


Abdominal pain


Chief Complaint:  Abdominal Pain


Time Seen by Provider:  23:44


Travel History


International Travel<30 days:  No


Contact w/Intl Traveler<30days:  No


Traveled to known affect area:  No





History of Present Illness


HPI


67-year-old female history of cancer, presents with having right sided 

abdominal pain nausea and vomiting unable to tolerate p.o. today.  Patient 

denies fever chills sweats, denies any dysuria urgency or frequency.  No 

significant travel history and no significant idiosyncratic food intake.  No 

recent antibiotics, no visitation anyone nursing home setting or hospital





[facility]


Past Medical History


*** Narrative Medical


Past medical history reviewed


Autoimmune Disease:  No


Cancer:  Yes (wartgring tumor)


Cardiac Catheterization:  Yes ( STINT 2002)


Cardiovascular Problems:  Yes (stent 2002)


High Cholesterol:  Yes


COPD:  Yes


Diabetes:  No


Diminished Hearing:  No


Endocrine:  No


Genitourinary:  No


Hepatitis:  No


Hiatal Hernia:  No


Hypertension:  Yes


Immune Disorder:  No


Musculoskeletal:  No


Neurologic:  No


Psychiatric:  No


Respiratory:  Yes (partial lung removal june 2017)


Immunizations Current:  Yes


Thyroid Disease:  No


Ectopic Pregnancy:  Yes





Past Surgical History


Abdominal Surgery:  No


AICD:  No


Body Medical Devices:  1 STENT CARDIAC


Ear Surgery:  No


Endocrine Surgery:  No


Eye Surgery:  No


Genitourinary Surgery:  No


Gynecologic Surgery:  Yes (hysterectomy)


Hysterectomy:  Yes


Joint Replacement:  No


Oral Surgery:  Yes (permanent bridge implanted)


Pacemaker:  No


Thoracic Surgery:  Yes (R SIDE )


Other Surgery:  Yes





Social History


Alcohol Use:  Yes (OCCAS BEER)


Tobacco Use:  Yes (1/2 PPD)


Substance Use:  No





Allergies-Medications


(Allergen,Severity, Reaction):  


Coded Allergies:  


     No Known Allergies (Verified  Allergy, Unknown, 3/28/18)


Reported Meds & Prescriptions





Reported Meds & Active Scripts


Active


Sm Stool Softener (Docusate Calcium) 240 Mg Cap 240 Mg PO HS


Reported


Calcium (Calcium Carbonate-Cholecalciferol) 1,250-100 Mg-Unit Chew 1 Tab CHEW 

DAILY


Glucosamine (Glucosamine Sulfate) 500 Mg Cap 500 Mg PO DAILY


Albuterol Neb (Albuterol Sulfate) 2.5 Mg/3 Ml Neb 2.5 Mg NEB TID NEB PRN


Zyrtec (Cetirizine HCl) 10 Mg Tablet 1 Tab PO DAILY


Amlodipine (Amlodipine Besylate) 5 Mg Tab 5 Mg PO DAILY


Multi For Her 50+ (Multiple Vitamins W/ Minerals) 1 Tab Tab 1 Tab PO DAILY


Atorvastatin (Atorvastatin Calcium) 80 Mg Tab 80 Mg PO HS


Alendronate (Alendronate Sodium) 70 Mg Tab 70 Mg PO Q7D


Lisinopril 40 Mg Tab 40 Mg PO BID





Narrative Medication


Allergies and medications reviewed





Review of Systems


Except as stated in HPI:  all other systems reviewed are Neg


General / Constitutional:  No: Fever


Eyes:  No: Visual changes


HENT:  No: Headaches


Cardiovascular:  No: Chest Pain or Discomfort


Respiratory:  No: Shortness of Breath


Gastrointestinal:  Positive: Nausea, Vomiting, Abdominal Pain, No: Diarrhea


Genitourinary:  No: Urgency, Frequency, Dysuria, Hematuria, Pelvic Pain, Flank 

Pain


Musculoskeletal:  No: Pain


Skin:  No Rash


Neurologic:  No: Weakness


Psychiatric:  No: Depression


Endocrine:  No: Polydipsia


Hematologic/Lymphatic:  No: Easy Bruising





Physical Exam


Narrative


GENERAL: Awake and alert, slightly ill-appearing, vital signs afebrile normal 

and stable


SKIN: Warm and dry.  Skin color is sallow and slightly pale, no diaphoresis 

cyanosis


HEAD: Atraumatic. Normocephalic. 


EYES: Pupils equal and round. No scleral icterus. No injection or drainage. 


ENT: No nasal bleeding or discharge.  Mucous membranes pink and moist.


NECK: Trachea midline. No JVD.  Supple full range of motion


CARDIOVASCULAR: Regular rate and rhythm.  S1-S2 no murmurs rubs gallops


RESPIRATORY: No accessory muscle use. Clear to auscultation. Breath sounds 

equal bilaterally. 


GASTROINTESTINAL: Abdomen soft, markedly tender right sided paracolic, 

nondistended. Hepatic and splenic margins not palpable. 


MUSCULOSKELETAL: Extremities without clubbing, cyanosis, or edema. No obvious 

deformities. 


NEUROLOGICAL: Awake and alert. No obvious focal deficit.


PSYCHIATRIC: Appropriate mood and affect; insight and judgment normal.





Data


Data


Last Documented VS





Vital Signs








  Date Time  Temp Pulse Resp B/P (MAP) Pulse Ox O2 Delivery O2 Flow Rate FiO2


 


3/28/18 00:52     98 Room Air  


 


3/27/18 23:43 98.1 82 16 154/69 (97)    








Orders





 Orders


Complete Blood Count With Diff (3/27/18 23:45)


Comprehensive Metabolic Panel (3/27/18 23:45)


Lipase (3/27/18 23:45)


Lactic Acid (3/27/18 23:45)


Prothrombin Time / Inr (Pt) (3/27/18 23:45)


Act Partial Throm Time (Ptt) (3/27/18 23:45)


Urinalysis - C+S If Indicated (3/27/18 23:45)


Iv Access Insert/Monitor (3/27/18 23:45)


Ecg Monitoring (3/27/18 23:45)


Oximetry (3/27/18 23:45)


Sodium Chloride 0.9% Flush (Ns Flush) (3/27/18 23:45)


Abdomen, Kub Only (3/27/18 23:45)


Sodium Chlor 0.9% 1000 Ml Inj (Ns 1000 M (3/28/18 00:00)


Ondansetron Inj (Zofran Inj) (3/28/18 00:00)


Ondansetron Inj (Zofran Inj) (3/27/18 23:55)


Ct Abd/Pel W Iv Contrast(Rout) (3/28/18 )


Oral Contrast - Adult (3/28/18 00:25)


Iohexol 350 Inj (Omnipaque 350 Inj) (3/28/18 01:15)


Hydromorphone Pf Inj (Dilaudid Pf Inj) (3/28/18 02:00)


Admit Order (Ed Use Only) (3/28/18 02:26)


Place In Observation (3/28/18 )


Vital Signs (Adult) Q4H (3/28/18 02:27)


Activity Oob With Assistance (3/28/18 02:27)


Cardiac Monitor / Telemetry .CONTINUOUS (3/28/18 02:27)


Diet Npo (3/28/18 Breakfast)


Sodium Chlor 0.9% 1000 Ml Inj (Ns 1000 M (3/28/18 02:27)


Sodium Chloride 0.9% Flush (Ns Flush) (3/28/18 02:30)


Sodium Chloride 0.9% Flush (Ns Flush) (3/28/18 02:30)


Ondansetron Inj (Zofran Inj) (3/28/18 02:30)


Basic Metabolic Panel (Bmp) (3/29/18 06:00)


Complete Blood Count With Diff (3/29/18 06:00)


Scd Bilateral/Knee High IVAN.BID (3/28/18 02:27)


Reji Bilateral/Knee High IVAN.QSHIFT (3/28/18 02:27)


Naloxone Inj (Narcan Inj) (3/28/18 02:30)


Prochlorperazine Inj (Compazine Inj) (3/28/18 02:30)





Labs





Laboratory Tests








Test


  3/28/18


00:20 3/28/18


00:24


 


White Blood Count 12.1 TH/MM3  


 


Red Blood Count 4.05 MIL/MM3  


 


Hemoglobin 12.0 GM/DL  


 


Hematocrit 36.1 %  


 


Mean Corpuscular Volume 89.1 FL  


 


Mean Corpuscular Hemoglobin 29.5 PG  


 


Mean Corpuscular Hemoglobin


Concent 33.2 % 


  


 


 


Red Cell Distribution Width 14.4 %  


 


Platelet Count 273 TH/MM3  


 


Mean Platelet Volume 7.3 FL  


 


Neutrophils (%) (Auto) 75.0 %  


 


Lymphocytes (%) (Auto) 16.7 %  


 


Monocytes (%) (Auto) 5.6 %  


 


Eosinophils (%) (Auto) 1.8 %  


 


Basophils (%) (Auto) 0.9 %  


 


Neutrophils # (Auto) 9.0 TH/MM3  


 


Lymphocytes # (Auto) 2.0 TH/MM3  


 


Monocytes # (Auto) 0.7 TH/MM3  


 


Eosinophils # (Auto) 0.2 TH/MM3  


 


Basophils # (Auto) 0.1 TH/MM3  


 


CBC Comment DIFF FINAL  


 


Differential Comment   


 


Prothrombin Time 10.1 SEC  


 


Prothromb Time International


Ratio 1.0 RATIO 


  


 


 


Activated Partial


Thromboplast Time 22.6 SEC 


  


 


 


Blood Urea Nitrogen 10 MG/DL  


 


Creatinine 0.74 MG/DL  


 


Random Glucose 93 MG/DL  


 


Total Protein 6.7 GM/DL  


 


Albumin 3.3 GM/DL  


 


Calcium Level 8.2 MG/DL  


 


Alkaline Phosphatase 61 U/L  


 


Aspartate Amino Transf


(AST/SGOT) 34 U/L 


  


 


 


Alanine Aminotransferase


(ALT/SGPT) 29 U/L 


  


 


 


Total Bilirubin 0.4 MG/DL  


 


Sodium Level 140 MEQ/L  


 


Potassium Level 3.8 MEQ/L  


 


Chloride Level 108 MEQ/L  


 


Carbon Dioxide Level 24.4 MEQ/L  


 


Anion Gap 8 MEQ/L  


 


Estimat Glomerular Filtration


Rate 79 ML/MIN 


  


 


 


Lactic Acid Level 0.5 mmol/L  


 


Lipase 146 U/L  


 


Urine Color  YELLOW 


 


Urine Turbidity  CLEAR 


 


Urine pH  6.5 


 


Urine Specific Gravity  1.010 


 


Urine Protein  TRACE mg/dL 


 


Urine Glucose (UA)  NEG mg/dL 


 


Urine Ketones  10 mg/dL 


 


Urine Occult Blood  SMALL 


 


Urine Nitrite  NEG 


 


Urine Bilirubin  NEG 


 


Urine Urobilinogen


  


  LESS THAN 2.0


MG/DL


 


Urine Leukocyte Esterase  TRACE 


 


Urine RBC  26 /hpf 


 


Urine WBC  3 /hpf 


 


Urine Squamous Epithelial


Cells 


  3 /hpf 


 


 


Urine Amorphous Sediment  RARE 


 


Urine Mucus  FEW /lpf 


 


Microscopic Urinalysis Comment


  


  CULT NOT


INDICATED











MDM


Medical Decision Making


Medical Screen Exam Complete:  Yes


Emergency Medical Condition:  Yes


Medical Record Reviewed:  Yes


Differential Diagnosis


Acute appendicitis, colonic obstruction, colitis, diverticulitis


Narrative Course


Patient's laboratory examinations reviewed, patient has mildly elevated white 

blood cell count 12.5.


CT abdomen pelvis reviewed, patient has dilated a sending colon with possible 

lead point at the hepatic flexure.  Appendix normal


Case discussed with hospitalist service, admitted for gastroenterology 

consultation and possible colonoscopy.  Patient's previous history of cancer 

discussed





Diagnosis





 Primary Impression:  


 Colonic obstruction





Admitting Information


Admitting Physician Requests:  Admit











Trevor Lee MD Mar 28, 2018 04:44

## 2018-03-28 NOTE — PD.CONS
HPI


History of Present Illness


This is a 67 year old F with PMH significant for COPD, HTN, RUL squamous cell 

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 

mediastinal lymph node dissection. Pt presented to the ER with complaints of 

abdominal pain that begins above her pelvis and radiates up to her umbilicus 

and to the right side of her abdomen.  Tried a heating pad with no relief. Pt 

reports yesterday morning she woke up with diarrhea, denies any blood in the 

stool or melena. Later in the day she felt that she needed to have a BM so she 

took a stool softener and was unable to go. Pt has history of "intestinal kink" 

multiple years ago, states resolved on its own and did not require surgical 

intervention. She feels like this pain was similar.  Also having nausea and one 

isolated episode of emesis, now just dry-heaving because she has not had 

anything to eat.  Admits to some chills, did not take her temperature.  Denies 

acid reflux, heartburn, dysphagia, odynophagia, weight loss.  Has never had 

EGD. Last colonoscopy was 5 yrs ago and states findings of polyps, due for 

repeat exam next month.  Admits to occasion ETOH.  Smokes 10 cigarettes a day. 

Denies illicit drug use.  Also admits to taking a lot of Ibuprofen recently for 

back pain.  


 (Ginette Romero)





PFSH


Past Medical History


Anxiety


Cataract


COPD


HTN


Hyperlipidemia


Osteoporosis


Adenocarcinoma of lung


Past Surgical History


Hysterectomy


Mitral valve replacement


left upper lobectomy and mediastinal lymph node dissection


Colonoscopy 


 (Ginette Romero)


Coded Allergies:  


     No Known Allergies (Verified  Allergy, Unknown, 3/28/18)


Social History


ETOH- drinks beer occasionally


Smokes 10 cigarettes a day


Denies illicit drug use 


 (Ginette Romero)





Review of Systems


Gastrointestinal:  COMPLAINS OF: Abdominal pain, Constipation, Diarrhea, Nausea

, Vomiting, DENIES: Black stools, Bloody stools, Difficulty Swallowing, 

Odynophagia, Swelling of Abdomen, Heartburn, Hematemesis (Ginette Romero)





GI Exam


Vitals I&O





Vital Signs








  Date Time  Temp Pulse Resp B/P (MAP) Pulse Ox O2 Delivery O2 Flow Rate FiO2


 


3/28/18 08:27   16     


 


3/28/18 07:00  89 16 134/69 (90) 98 Room Air  


 


3/28/18 05:25  79 16 129/65 (86) 98 Room Air  


 


3/28/18 03:37  79 17 101/59 (73) 96 Room Air  


 


3/28/18 00:52     98 Room Air  


 


3/27/18 23:43 98.1 82 16 154/69 (97)    








Imaging





Last Impressions








Abdomen/Pelvis CT 3/28/18 0000 Signed





Impressions: 





 Service Date/Time:  Wednesday, March 28, 2018 01:04 - CONCLUSION:  1. Small 





 amount of pelvic free fluid. 2. Normal appendix. 3. Gaseous distention of the 





 ascending colon without obstruction.     Jared Peralta MD 


 


Abdomen X-Ray 3/27/18 2345 Signed





Impressions: 





 Service Date/Time:  Wednesday, March 28, 2018 00:02 - CONCLUSION:  Mild 

gaseous 





 distention of bowel loops in right abdomen without obstruction. Round 





 benign-appearing calcification to the left of L4 vertebral body.     Jared Peralta MD 








Laboratory











Test


  3/28/18


00:20 3/28/18


00:24


 


White Blood Count 12.1 TH/MM3  


 


Red Blood Count 4.05 MIL/MM3  


 


Hemoglobin 12.0 GM/DL  


 


Hematocrit 36.1 %  


 


Mean Corpuscular Volume 89.1 FL  


 


Mean Corpuscular Hemoglobin 29.5 PG  


 


Mean Corpuscular Hemoglobin


Concent 33.2 % 


  


 


 


Red Cell Distribution Width 14.4 %  


 


Platelet Count 273 TH/MM3  


 


Mean Platelet Volume 7.3 FL  


 


Neutrophils (%) (Auto) 75.0 %  


 


Lymphocytes (%) (Auto) 16.7 %  


 


Monocytes (%) (Auto) 5.6 %  


 


Eosinophils (%) (Auto) 1.8 %  


 


Basophils (%) (Auto) 0.9 %  


 


Neutrophils # (Auto) 9.0 TH/MM3  


 


Lymphocytes # (Auto) 2.0 TH/MM3  


 


Monocytes # (Auto) 0.7 TH/MM3  


 


Eosinophils # (Auto) 0.2 TH/MM3  


 


Basophils # (Auto) 0.1 TH/MM3  


 


CBC Comment DIFF FINAL  


 


Differential Comment   


 


Prothrombin Time 10.1 SEC  


 


Prothromb Time International


Ratio 1.0 RATIO 


  


 


 


Activated Partial


Thromboplast Time 22.6 SEC 


  


 


 


Blood Urea Nitrogen 10 MG/DL  


 


Creatinine 0.74 MG/DL  


 


Random Glucose 93 MG/DL  


 


Total Protein 6.7 GM/DL  


 


Albumin 3.3 GM/DL  


 


Calcium Level 8.2 MG/DL  


 


Alkaline Phosphatase 61 U/L  


 


Aspartate Amino Transf


(AST/SGOT) 34 U/L 


  


 


 


Alanine Aminotransferase


(ALT/SGPT) 29 U/L 


  


 


 


Total Bilirubin 0.4 MG/DL  


 


Sodium Level 140 MEQ/L  


 


Potassium Level 3.8 MEQ/L  


 


Chloride Level 108 MEQ/L  


 


Carbon Dioxide Level 24.4 MEQ/L  


 


Anion Gap 8 MEQ/L  


 


Estimat Glomerular Filtration


Rate 79 ML/MIN 


  


 


 


Lactic Acid Level 0.5 mmol/L  


 


Lipase 146 U/L  


 


Urine Color  YELLOW 


 


Urine Turbidity  CLEAR 


 


Urine pH  6.5 


 


Urine Specific Gravity  1.010 


 


Urine Protein  TRACE mg/dL 


 


Urine Glucose (UA)  NEG mg/dL 


 


Urine Ketones  10 mg/dL 


 


Urine Occult Blood  SMALL 


 


Urine Nitrite  NEG 


 


Urine Bilirubin  NEG 


 


Urine Urobilinogen


  


  LESS THAN 2.0


MG/DL


 


Urine Leukocyte Esterase  TRACE 


 


Urine RBC  26 /hpf 


 


Urine WBC  3 /hpf 


 


Urine Squamous Epithelial


Cells 


  3 /hpf 


 


 


Urine Amorphous Sediment  RARE 


 


Urine Mucus  FEW /lpf 


 


Microscopic Urinalysis Comment


  


  CULT NOT


INDICATED








Physical Examination


HEENT: Normocephalic; atraumatic


CHEST:  Even/unlabored 


CARDIAC:  RRR


ABDOMEN:  Soft, nondistended, mild lower abdominal TTP, bowel sounds active 


EXTREMITIES: No clubbing, cyanosis, or edema.


SKIN:  Normal; no rash; no jaundice.


CNS:  No focal deficits; alert and oriented times three.


 (Ginette Romero [facility])





Assessment and Plan


Plan


Assessment:


- Reports of abdominal pain- states starts in low mid abdomen radiates to


  umbilicus and right side of abdomen. Reports diarrhea yesterday morning


  which stopped then she felt she needed to have a BM so she took a 


  stool softener with no BM and no relief of symptoms.  States history of


  "kink in her intestine" resolved on its own, denies surgery. 


  Ct abdomen and pelvis W IV contrast --> Small amount of pelvic free fluid.


  Normal appendix, Gaseous distention of the ascending colon without 


  obstruction. 


  Last colonoscopy 5 years ago, states polyps, due for repeat exam


  next month 


- Nausea and vomiting- one isolated episode- yesterday, now just dry


  heaving, has not eaten.  Denies hematemesis and coffee ground emesis.


  Has never had EGD.


  Of note, reports taking Ibuprofen frequently for back pain. 





Plan:


EGD/colonoscopy


  Indication(EGD: N/vomiting/dry heaving- frequent Ibuprofen use)


                (Colonoscopy: Abdominal pain, distention)


Obtain consent


Clear liquids today


Magnesium Citrate prep


NPO after MN


Zofran PRN for nausea 


Stool studies


Further recommendations based on findings of above





Pt has been seen and examined by myself and Dr. Galindo and this note is 

written on his behalf 


 (Ginette Romero)


Physician Comments


Patient seen and examined


Agree with above


Continue with current supportive care


Monitor labs


Plan for an EGD and a colonoscopy tomorrow


 (Roger Galindo MD)











Ginette Romero Mar 28, 2018 10:29


Roger Galindo MD Mar 28, 2018 23:00

## 2018-03-28 NOTE — RADRPT
EXAM DATE/TIME:  03/28/2018 01:04 

 

[facility] COMPARISON:     

No previous studies available for comparison.

 

 

INDICATIONS :     

Right side abdominal pain and vomiting. 

                      

 

IV CONTRAST:     

90 cc Omnipaque 350 (iohexol) IV 

 

 

ORAL CONTRAST:      

No oral contrast ingested.

                      

 

RADIATION DOSE:     

6.64 CTDIvol (mGy) 

 

 

MEDICAL HISTORY :     

Hypertension.  CAD. COPD. Squamous cell carcinoma. 

 

SURGICAL HISTORY :      

Hysterectomy. Coronary artery stent.

 

ENCOUNTER:      

Initial

 

ACUITY:      

3 days

 

PAIN SCALE:      

10/10

 

LOCATION:       

Right  abdomen. 

 

TECHNIQUE:     

Volumetric scanning of the abdomen and pelvis was performed.  Using automated exposure control and ad
justment of the mA and/or kV according to patient size, radiation dose was kept as low as reasonably 
achievable to obtain optimal diagnostic quality images.  DICOM format image data is available electro
nically for review and comparison.  

 

FINDINGS:     

 

LOWER LUNGS:     

The visualized lower lungs are clear.

 

LIVER:     

Homogeneous density without lesion.  There is no dilation of the biliary tree.  No calcified gallston
es.

 

SPLEEN:     

Normal size without lesion.

 

PANCREAS:     

Within normal limits.

 

KIDNEYS:     

Normal in size and shape.  There is no mass, stone or hydronephrosis.

 

ADRENAL GLANDS:     

Within normal limits.

 

VASCULAR:     

There is no aortic aneurysm.

 

BOWEL/MESENTERY:     

Gaseous distention of the ascending colon. No obstruction. Normal appendix.  There is no free intrape
ritoneal air. Small amount of pelvic fluid.

 

ABDOMINAL WALL:     

Within normal limits.

 

RETROPERITONEUM:     

There is no lymphadenopathy. 

 

BLADDER:     

No wall thickening or mass. 

 

REPRODUCTIVE:     

Within normal limits.

 

INGUINAL:     

There is no lymphadenopathy or hernia. 

 

MUSCULOSKELETAL:     

Within normal limits for patient age. 

 

CONCLUSION:     

1. Small amount of pelvic free fluid.

2. Normal appendix.

3. Gaseous distention of the ascending colon without obstruction.

 

 

 

 Jared Peralta MD on March 28, 2018 at 1:30           

Board Certified Radiologist.

 This report was verified electronically.

## 2018-03-28 NOTE — HHI.HP
__________________________________________________





[facility]


Service


[facility]


Primary Care Physician


Non-Staff


Admission Diagnosis





Colonic Obstruction


Diagnoses:  


Chief Complaint:  


Abdominal pain


Travel History


International Travel<30 Days:  No


Contact w/Intl Traveler <30 Da:  No


Traveled to Known Affected Are:  No


History of Present Illness


Patient is a 67-year-old female.  History of lung cancer.  Presents with having 

right sided abdominal pain with some nausea and vomiting.  Has been unable to 

tolerate oral intake yesterday.  Patient denies any fevers denies any chills 

denies any sweating denies any dysuria urgency or frequency.  Denies any recent 

antibiotics.  Denies any contact with anyone sick.  Denies any strange food 

intake.  Denies any urinary symptoms.  States she has not had a bowel movement 

since yesterday





Review of Systems


Constitutional:  COMPLAINS OF: Change in appetite, DENIES: Diaphoretic episodes

, Fatigue, Fever, Weight gain, Weight loss, Chills, Dizziness, Night Sweats


Endocrine:  DENIES: Abnorml menstrual pattern, Heat/cold intolerance, Polydipsia

, Polyuria, Polyphagia


Eyes:  DENIES: Blurred vision, Diplopia, Eye inflammation, Eye pain, Vision loss

, Photosensitivity


Ears, nose, mouth, throat:  DENIES: Tinnitus, Hearing loss, Vertigo, Nasal 

discharge, Oral lesions, Throat pain, Hoarseness, Ear Pain, Running Nose


Respiratory:  DENIES: Apneas, Cough, Snoring, Wheezing, Hemoptysis, Sputum 

production, Shortness of breath


Cardiovascular:  DENIES: Chest pain, Palpitations, Syncope, Dyspnea on Exertion

, PND, Lower Extremity Edema


Gastrointestinal:  COMPLAINS OF: Abdominal pain, Nausea, Vomiting, DENIES: 

Black stools, Bloody stools, Constipation, Diarrhea, Difficulty Swallowing


Genitourinary:  DENIES: Abnormal vaginal bleeding, Dysmenorrhea, Dyspareunia, 

Sexual dysfunction, Urinary frequency, Urinary incontinence


Musculoskeletal:  DENIES: Joint pain, Muscle aches, Stiffness, Joint Swelling, 

Back pain, Neck pain


Integumentary:  DENIES: Abnormal pigmentation, Pruritus, Rash, Nail changes, 

Breast masses, Breast skin changes, Nipple discharge


Hematologic/lymphatic:  DENIES: Bruising, Lymphadenopathy


Immunologic/allergic:  DENIES: Eczema, Urticaria


Neurologic:  DENIES: Abnormal gait, Headache, Localized weakness, Paresthesias, 

Seizures, Speech Problems, Tremor, Poor Balance


Psychiatric:  DENIES: Anxiety, Confusion, Mood changes, Depression, 

Hallucinations, Agitation, Suicidal Ideation, Homicidal Ideation, Delusions


Except as stated in HPI:  all other systems reviewed are Neg





Past Family Social History


Past Medical History


Lung tumor with removal


History of cardiac stenting


Hyperlipidemia


COPD tobacco abuse


Hypertension


Partial lung removal in 2017


History of ectopic pregnant


Past Surgical History


History of cardiac stenting


Hysterectomy


Oral surgery for permanent bridge implantation


Right-sided lung surgery


Reported Medications





Reported Meds & Active Scripts


Active


Sm Stool Softener (Docusate Calcium) 240 Mg Cap 240 Mg PO HS


Reported


Calcium (Calcium Carbonate-Cholecalciferol) 1,250-100 Mg-Unit Chew 1 Tab CHEW 

DAILY


Glucosamine (Glucosamine Sulfate) 500 Mg Cap 500 Mg PO DAILY


Albuterol Neb (Albuterol Sulfate) 2.5 Mg/3 Ml Neb 2.5 Mg NEB TID NEB PRN


Zyrtec (Cetirizine HCl) 10 Mg Tablet 1 Tab PO DAILY


Amlodipine (Amlodipine Besylate) 5 Mg Tab 5 Mg PO DAILY


Multi For Her 50+ (Multiple Vitamins W/ Minerals) 1 Tab Tab 1 Tab PO DAILY


Atorvastatin (Atorvastatin Calcium) 80 Mg Tab 80 Mg PO HS


Alendronate (Alendronate Sodium) 70 Mg Tab 70 Mg PO Q7D


Lisinopril 40 Mg Tab 40 Mg PO BID


Allergies:  


Coded Allergies:  


     No Known Allergies (Verified  Allergy, Unknown, 3/28/18)


Active Ordered Medications





Current Medications


Sodium Chloride (NS Flush) 2 ml UNSCH  PRN IV FLUSH FLUSH AFTER USING IV ACCESS 

Last administered on 3/28/18at 00:50;  Start 3/27/18 at 23:45;  Stop 3/28/18 at 

02:40;  Status DC


Sodium Chloride 1,000 ml @  999 mls/hr BOLUS  ONCE IV  Last administered on 3/28

/18at 00:02;  Start 3/28/18 at 00:00;  Stop 3/28/18 at 01:00;  Status DC


Ondansetron HCl (Zofran Inj) 4 mg ONCE  ONCE IV PUSH  Last administered on 3/28/

18at 00:02;  Start 3/28/18 at 00:00;  Stop 3/28/18 at 00:01;  Status DC


Ondansetron HCl (Zofran Inj) 4 mg STK-MED ONCE .ROUTE ;  Start 3/27/18 at 23:55

;  Stop 3/27/18 at 23:56;  Status DC


Iohexol (Omnipaque 350 Inj) 90 ml STK-MED ONCE IVCONTRAST  Last administered on 

3/28/18at 01:15;  Start 3/28/18 at 01:15;  Stop 3/28/18 at 01:16;  Status DC


Hydromorphone HCl (Dilaudid Pf Inj) 1 mg ONCE  ONCE IV PUSH  Last administered 

on 3/28/18at 02:56;  Start 3/28/18 at 02:00;  Stop 3/28/18 at 02:01;  Status DC


Sodium Chloride 1,000 ml @  100 mls/hr Q10H IV  Last administered on 3/28/18at 

03:37;  Start 3/28/18 at 02:27


Sodium Chloride (NS Flush) 2 ml UNSCH  PRN IV FLUSH FLUSH AFTER USING IV ACCESS

;  Start 3/28/18 at 02:30


Sodium Chloride (NS Flush) 2 ml BID IV FLUSH ;  Start 3/28/18 at 02:30


Ondansetron HCl (Zofran Inj) 4 mg Q6H  PRN IVP NAUSEA OR VOMITING Last 

administered on 3/28/18at 07:57;  Start 3/28/18 at 02:30


Naloxone HCl (Narcan Inj) 0.4 mg UNSCH  PRN IV PUSH SEE LABEL COMMENTS;  Start 3

/28/18 at 02:30


Hydromorphone HCl (Dilaudid Pf Inj) 1 mg Q4H  PRN IV PAIN GREATER THAN 4 Last 

administered on 3/28/18at 07:57;  Start 3/28/18 at 02:45


Prochlorperazine Edisylate (Compazine Inj) 5 mg Q4H  PRN IV PUSH NAUSEA OR 

VOMITING;  Start 3/28/18 at 02:30


Albuterol/ Ipratropium (Duoneb Neb) 1 ampule Q4HR NEB  PRN NEB SOB/WHEEZING;  

Start 3/28/18 at 02:45


Enalaprilat (Vasotec Inj) 2.5 mg Q6H  PRN IV PUSH SEE LABEL COMMENTS;  Start 3/

28/18 at 02:45


Sodium Chloride 1,000 ml @  999 mls/hr BOLUS  ONCE IV  Last administered on 3/28

/18at 04:21;  Start 3/28/18 at 03:45;  Stop 3/28/18 at 04:45;  Status DC


Family History


High blood pressure and heart disease in the family


Social History


Occasional beer


Still smokes a half a pack of cigarettes daily


Denies any substance abuse





Physical Exam


Vital Signs





Vital Signs








  Date Time  Temp Pulse Resp B/P (MAP) Pulse Ox O2 Delivery O2 Flow Rate FiO2


 


3/28/18 08:27   16     


 


3/28/18 07:00  89 16 134/69 (90) 98 Room Air  


 


3/28/18 05:25  79 16 129/65 (86) 98 Room Air  


 


3/28/18 03:37  79 17 101/59 (73) 96 Room Air  


 


3/28/18 00:52     98 Room Air  


 


3/27/18 23:43 98.1 82 16 154/69 (97)    








Physical Exam


GENERAL: This is a well-nourished, well-developed patient, in mild distress.


SKIN: No rashes, ecchymoses or lesions. Cool and dry.


HEAD: Atraumatic. Normocephalic. No temporal or scalp tenderness.


EYES: Pupils equal round and reactive. Extraocular motions intact. No scleral 

icterus. No injection or drainage. 


ENT: Nose without bleeding, purulent drainage or septal hematoma. Throat 

without erythema, tonsillar hypertrophy or exudate. Uvula midline. Airway 

patent.


NECK: Trachea midline. No JVD or lymphadenopathy. Supple, nontender, no 

meningeal signs.


CARDIOVASCULAR: Regular rate and rhythm without murmurs, gallops, or rubs.  S1-

S2 no S3 or S4


RESPIRATORY: Clear to auscultation. Breath sounds equal bilaterally. No wheezes

, rales, or rhonchi.  


GASTROINTESTINAL: Abdomen soft,  nondistended. No hepato-splenomegaly, or 

palpable masses. No guarding.  Mild abdominal tenderness right side of abdomen 

and right lower quadrant


MUSCULOSKELETAL: Extremities without clubbing, cyanosis, or edema. No joint 

tenderness, effusion, or edema noted. No calf tenderness. Negative Homans sign 

bilaterally.


NEUROLOGICAL: Awake and alert. Cranial nerves II through XII intact.  Motor and 

sensory grossly within normal limits. Five out of 5 muscle strength in all 

muscle groups.  Normal speech.


Insight and judgment is good


Mood and behaviors are appropriate


Laboratory





Laboratory Tests








Test


  3/28/18


00:20 3/28/18


00:24


 


White Blood Count 12.1  


 


Red Blood Count 4.05  


 


Hemoglobin 12.0  


 


Hematocrit 36.1  


 


Mean Corpuscular Volume 89.1  


 


Mean Corpuscular Hemoglobin 29.5  


 


Mean Corpuscular Hemoglobin


Concent 33.2 


  


 


 


Red Cell Distribution Width 14.4  


 


Platelet Count 273  


 


Mean Platelet Volume 7.3  


 


Neutrophils (%) (Auto) 75.0  


 


Lymphocytes (%) (Auto) 16.7  


 


Monocytes (%) (Auto) 5.6  


 


Eosinophils (%) (Auto) 1.8  


 


Basophils (%) (Auto) 0.9  


 


Neutrophils # (Auto) 9.0  


 


Lymphocytes # (Auto) 2.0  


 


Monocytes # (Auto) 0.7  


 


Eosinophils # (Auto) 0.2  


 


Basophils # (Auto) 0.1  


 


CBC Comment DIFF FINAL  


 


Differential Comment   


 


Prothrombin Time 10.1  


 


Prothromb Time International


Ratio 1.0 


  


 


 


Activated Partial


Thromboplast Time 22.6 


  


 


 


Blood Urea Nitrogen 10  


 


Creatinine 0.74  


 


Random Glucose 93  


 


Total Protein 6.7  


 


Albumin 3.3  


 


Calcium Level 8.2  


 


Alkaline Phosphatase 61  


 


Aspartate Amino Transf


(AST/SGOT) 34 


  


 


 


Alanine Aminotransferase


(ALT/SGPT) 29 


  


 


 


Total Bilirubin 0.4  


 


Sodium Level 140  


 


Potassium Level 3.8  


 


Chloride Level 108  


 


Carbon Dioxide Level 24.4  


 


Anion Gap 8  


 


Estimat Glomerular Filtration


Rate 79 


  


 


 


Lactic Acid Level 0.5  


 


Lipase 146  


 


Urine Color  YELLOW 


 


Urine Turbidity  CLEAR 


 


Urine pH  6.5 


 


Urine Specific Gravity  1.010 


 


Urine Protein  TRACE 


 


Urine Glucose (UA)  NEG 


 


Urine Ketones  10 


 


Urine Occult Blood  SMALL 


 


Urine Nitrite  NEG 


 


Urine Bilirubin  NEG 


 


Urine Urobilinogen  LESS THAN 2.0 


 


Urine Leukocyte Esterase  TRACE 


 


Urine RBC  26 


 


Urine WBC  3 


 


Urine Squamous Epithelial


Cells 


  3 


 


 


Urine Amorphous Sediment  RARE 


 


Urine Mucus  FEW 


 


Microscopic Urinalysis Comment


  


  CULT NOT


INDICATED








Result Diagram:  


3/28/18 0020                                                                   

             3/28/18 0020





Imaging





Last Impressions








Abdomen/Pelvis CT 3/28/18 0000 Signed





Impressions: 





 Service Date/Time:  2018 01:04 - CONCLUSION:  1. Small 





 amount of pelvic free fluid. 2. Normal appendix. 3. Gaseous distention of the 





 ascending colon without obstruction.     Jared Peralta MD 


 


Abdomen X-Ray 3/27/18 2345 Signed





Impressions: 





 Service Date/Time:  2018 00:02 - CONCLUSION:  Mild 

gaseous 





 distention of bowel loops in right abdomen without obstruction. Round 





 benign-appearing calcification to the left of L4 vertebral body.     Jared Peralta MD 











Caprinseth VTE Risk Assessment


Caprini VTE Risk Assessment:  Mod/High Risk (score >= 2)


Caprini Risk Assessment Model











 Point Value = 1          Point Value = 2  Point Value = 3  Point Value = 5


 


Age 41-60


Minor surgery


BMI > 25 kg/m2


Swollen legs


Varicose veins


Pregnancy or postpartum


History of unexplained or recurrent


   spontaneous 


Oral contraceptives or hormone


   replacement


Sepsis (< 1 month)


Serious lung disease, including


   pneumonia (< 1 month)


Abnormal pulmonary function


Acute myocardial infarction


Congestive heart failure (< 1 month)


History of inflammatory bowel disease


Medical patient at bed rest Age 61-74


Arthroscopic surgery


Major open surgery (> 45 min)


Laparoscopic surgery (> 45 min)


Malignancy


Confined to bed (> 72 hours)


Immobilizing plaster cast


Central venous access Age >= 75


History of VTE


Family history of VTE


Factor V Leiden


Prothrombin 08172L


Lupus anticoagulant


Anticardiolipin antibodies


Elevated serum homocysteine


Heparin-induced thrombocytopenia


Other congenital or acquired


   thrombophilia Stroke (< 1 month)


Elective arthroplasty


Hip, pelvis, or leg fracture


Acute spinal cord injury (< 1 month)








Prophylaxis Regimen











   Total Risk


Factor Score Risk Level Prophylaxis Regimen


 


0-1      Low Early ambulation


 


2 Moderate Order ONE of the following:


*Sequential Compression Device (SCD)


*Heparin 5000 units SQ BID


 


3-4 Higher Order ONE of the following medications:


*Heparin 5000 units SQ TID


*Enoxaparin/Lovenox 40 mg SQ daily (WT < 150 kg, CrCl > 30 mL/min)


*Enoxaparin/Lovenox 30 mg SQ daily (WT < 150 kg, CrCl > 10-29 mL/min)


*Enoxaparin/Lovenox 30 mg SQ BID (WT < 150 kg, CrCl > 30 mL/min)


AND/OR


*Sequential Compression Device (SCD)


 


5 or more Highest Order ONE of the following medications:


*Heparin 5000 units SQ TID (Preferred with Epidurals)


*Enoxaparin/Lovenox 40 mg SQ daily (WT < 150 kg, CrCl > 30 mL/min)


*Enoxaparin/Lovenox 30 mg SQ daily (WT < 150 kg, CrCl > 10-29 mL/min)


*Enoxaparin/Lovenox 30 mg SQ BID (WT < 150 kg, CrCl > 30 mL/min)


AND


*Sequential Compression Device (SCD)











Assessment and Plan


Assessment and Plan


Abdominal pain and bloating we will consult gastroenterology





Constipation might need a colonoscopy





Nausea vomiting continue on antiemetics





Leukocytosis a.m. labs





Tobacco abuse recommend smoking cessation





History of right lung resection for cancer





History of hysterectomy


Code Status


Full code


Discussed Condition With


Consult gastroenterology discussed with them 


Discussed with patient and RN











Eron Greenfield DO Mar 28, 2018 10:11

## 2018-03-29 VITALS
TEMPERATURE: 97.7 F | SYSTOLIC BLOOD PRESSURE: 137 MMHG | RESPIRATION RATE: 18 BRPM | OXYGEN SATURATION: 90 % | DIASTOLIC BLOOD PRESSURE: 64 MMHG | HEART RATE: 86 BPM

## 2018-03-29 VITALS
DIASTOLIC BLOOD PRESSURE: 69 MMHG | TEMPERATURE: 98.1 F | SYSTOLIC BLOOD PRESSURE: 144 MMHG | OXYGEN SATURATION: 90 % | RESPIRATION RATE: 20 BRPM | HEART RATE: 102 BPM

## 2018-03-29 VITALS
DIASTOLIC BLOOD PRESSURE: 66 MMHG | RESPIRATION RATE: 18 BRPM | OXYGEN SATURATION: 94 % | SYSTOLIC BLOOD PRESSURE: 136 MMHG | TEMPERATURE: 98.4 F | HEART RATE: 98 BPM

## 2018-03-29 VITALS — HEART RATE: 84 BPM

## 2018-03-29 VITALS
HEART RATE: 90 BPM | TEMPERATURE: 97.8 F | SYSTOLIC BLOOD PRESSURE: 119 MMHG | DIASTOLIC BLOOD PRESSURE: 66 MMHG | OXYGEN SATURATION: 90 % | RESPIRATION RATE: 16 BRPM

## 2018-03-29 VITALS
SYSTOLIC BLOOD PRESSURE: 119 MMHG | DIASTOLIC BLOOD PRESSURE: 73 MMHG | TEMPERATURE: 98.2 F | HEART RATE: 87 BPM | OXYGEN SATURATION: 94 % | RESPIRATION RATE: 16 BRPM

## 2018-03-29 RX ADMIN — OXYCODONE HYDROCHLORIDE AND ACETAMINOPHEN PRN TAB: 5; 325 TABLET ORAL at 09:08

## 2018-03-29 RX ADMIN — DEXTROSE MONOHYDRATE, SODIUM CHLORIDE, AND POTASSIUM CHLORIDE SCH MLS/HR: 50; 9; 1.49 INJECTION, SOLUTION INTRAVENOUS at 23:35

## 2018-03-29 RX ADMIN — Medication SCH ML: at 09:08

## 2018-03-29 RX ADMIN — MULTIPLE VITAMINS W/ MINERALS TAB SCH TAB: TAB at 09:07

## 2018-03-29 RX ADMIN — Medication SCH ML: at 21:00

## 2018-03-29 RX ADMIN — PHENYTOIN SODIUM SCH MLS/HR: 50 INJECTION INTRAMUSCULAR; INTRAVENOUS at 07:00

## 2018-03-29 RX ADMIN — LISINOPRIL SCH MG: 20 TABLET ORAL at 21:00

## 2018-03-29 RX ADMIN — STANDARDIZED SENNA CONCENTRATE AND DOCUSATE SODIUM SCH TAB: 8.6; 5 TABLET, FILM COATED ORAL at 09:00

## 2018-03-29 RX ADMIN — ALBUTEROL SULFATE PRN MG: 2.5 SOLUTION RESPIRATORY (INHALATION) at 09:53

## 2018-03-29 RX ADMIN — ALVIMOPAN SCH MG: 12 CAPSULE ORAL at 23:45

## 2018-03-29 RX ADMIN — OXYCODONE HYDROCHLORIDE AND ACETAMINOPHEN PRN TAB: 10; 325 TABLET ORAL at 18:04

## 2018-03-29 RX ADMIN — ATORVASTATIN CALCIUM SCH MG: 80 TABLET, FILM COATED ORAL at 21:00

## 2018-03-29 RX ADMIN — ONDANSETRON PRN MG: 2 INJECTION, SOLUTION INTRAMUSCULAR; INTRAVENOUS at 03:02

## 2018-03-29 RX ADMIN — CETIRIZINE HYDROCHLORIDE SCH MG: 10 TABLET, FILM COATED ORAL at 09:07

## 2018-03-29 RX ADMIN — HYDROMORPHONE HYDROCHLORIDE PRN MG: 2 INJECTION INTRAMUSCULAR; INTRAVENOUS; SUBCUTANEOUS at 03:02

## 2018-03-29 RX ADMIN — PHENYTOIN SODIUM SCH MLS/HR: 50 INJECTION INTRAMUSCULAR; INTRAVENOUS at 18:05

## 2018-03-29 RX ADMIN — LISINOPRIL SCH MG: 20 TABLET ORAL at 09:07

## 2018-03-29 RX ADMIN — ONDANSETRON PRN MG: 2 INJECTION, SOLUTION INTRAMUSCULAR; INTRAVENOUS at 08:58

## 2018-03-29 RX ADMIN — NICOTINE SCH PATCH: 14 PATCH, EXTENDED RELEASE TOPICAL at 17:51

## 2018-03-29 NOTE — PD.PROCEDR
GI Procedure





PROCEDURE PERFORMED


EGD with biopsy followed by a colonoscopy with snare polypectomy and biopsy





INDICATION FOR PROCEDURE


Abdominal pain, nausea vomiting, diarrhea, abnormal CT showing distended a 

ascending colon





PROCEDURE:


The procedure, risks and benefits were discussed with Ms. Ward and informed


consent was obtained.  Anesthesia sedated her with Diprivan.  She was placed in 

the left lateral decubitus position.





EGD:


The Pentax videoscope was introduced through the oropharynx and advanced to the 

second portion of the duodenum under direct visualization. Retroflexion was 

performed in the stomach.





FINDINGS:


The esophagus there was mucosal friability in the distal esophagus with some 

minor oozing of blood of unclear significance possibly esophagitis this was 

biopsied


The stomach there was patchy erythema in the antrum but no ulcerations no 

erosions no blood or bleeding the rest of the stomach was unremarkable antral 

biopsies were taken for further evaluation


The duodenum this was normal random biopsies were taken for further evaluation 

of diarrhea





Colonoscopy:


The Pentax videoscope was introduced through the rectum and advanced to 

proximal transverse. Retroflexion was performed in the rectum. Colonic prep was 

good





FINDINGS:


I was unable to advance the scope beyond the hepatic flexure due to a twist or 

torsion in the colon that I was unable to undo the patient tolerated the 

colonic prep well and has prep 12 so I do not think there is any obstruction 

but certainly with the CT showing a dilated ascending and this division of 

possible torsion or maybe even a stricture it is unclear to me as to what is 

going on beyond this point there was a medium-sized sessile polyp in the 

transverse colon that was removed using cold snare technique otherwise colonic 

examination was unremarkable biopsies were taken from the proximal transverse 

colon and ascending colon to further evaluate for diarrhea retroflexion in the 

rectum was unremarkable and so his rectal examination





ESTIMATED BLOOD LOSS:


None





SPECIMENS REMOVED:


Esophageal, gastric, colon biopsies





COMPLICATIONS:


None





IMPRESSION:


Esophagitis


Gastritis


Colon polyp


Colonic torsion or volvulus





PLAN:


Await biopsies


Supportive care


PPI


Colorectal surgical evaluation











Roger Galindo MD Mar 29, 2018 17:09

## 2018-03-29 NOTE — HHI.PR
Subjective


Remarks


Patient is a 67-year-old female.  History of lung cancer.  Presents with having 

right sided abdominal pain with some nausea and vomiting.  Has been unable to 

tolerate oral intake yesterday.  Patient denies any fevers denies any chills 

denies any sweating denies any dysuria urgency or frequency.  Denies any recent 

antibiotics.  Denies any contact with anyone sick.  Denies any strange food 

intake.  Denies any urinary symptoms.  States she has not had a bowel movement 

since yesterday





3-29 STILL HAVING ABDOMINAL PAIN- TO HAVE GI STUDIES LATER TODAY


Patient continues to have abdominal pain


Await GI studies later today


Did some prepping for


Having some nausea and vomiting





Objective


Vitals





Vital Signs








  Date Time  Temp Pulse Resp B/P (MAP) Pulse Ox O2 Delivery O2 Flow Rate FiO2


 


3/29/18 12:10 97.7 86 18 137/64 (88) 90   


 


3/29/18 08:33 98.1 102 20 144/69 (94) 90   


 


3/29/18 05:57   16     


 


3/29/18 05:45 97.8 90 16 119/66 (83) 90   


 


3/29/18 02:57 98.4 98 18 136/66 (89) 94   


 


3/28/18 20:29 98.2 105 16 165/81 (109) 92   


 


3/28/18 18:45 98.1 96 16 125/68 (87) 98   


 


3/28/18 18:20        


 


3/28/18 18:00  96 16 119/69 (86) 97 Room Air  


 


3/28/18 16:00  96 16 108/53 (71) 97 Room Air  














I/O      


 


 3/28/18 3/28/18 3/28/18 3/29/18 3/29/18 3/29/18





 07:00 15:00 23:00 07:00 15:00 23:00


 


Intake Total  3000 ml    


 


Balance  3000 ml    


 


      


 


Intake IV Total  3000 ml    








Result Diagram:  


3/28/18 0020                                                                   

             3/28/18 0020





Other Results





 Laboratory Tests








Test


  3/28/18


00:20 3/28/18


00:24


 


White Blood Count 12.1 TH/MM3  


 


Red Blood Count 4.05 MIL/MM3  


 


Hemoglobin 12.0 GM/DL  


 


Hematocrit 36.1 %  


 


Mean Corpuscular Volume 89.1 FL  


 


Mean Corpuscular Hemoglobin 29.5 PG  


 


Mean Corpuscular Hemoglobin


Concent 33.2 % 


  


 


 


Red Cell Distribution Width 14.4 %  


 


Platelet Count 273 TH/MM3  


 


Mean Platelet Volume 7.3 FL  


 


Neutrophils (%) (Auto) 75.0 %  


 


Lymphocytes (%) (Auto) 16.7 %  


 


Monocytes (%) (Auto) 5.6 %  


 


Eosinophils (%) (Auto) 1.8 %  


 


Basophils (%) (Auto) 0.9 %  


 


Neutrophils # (Auto) 9.0 TH/MM3  


 


Lymphocytes # (Auto) 2.0 TH/MM3  


 


Monocytes # (Auto) 0.7 TH/MM3  


 


Eosinophils # (Auto) 0.2 TH/MM3  


 


Basophils # (Auto) 0.1 TH/MM3  


 


CBC Comment DIFF FINAL  


 


Differential Comment   


 


Prothrombin Time 10.1 SEC  


 


Prothromb Time International


Ratio 1.0 RATIO 


  


 


 


Activated Partial


Thromboplast Time 22.6 SEC 


  


 


 


Blood Urea Nitrogen 10 MG/DL  


 


Creatinine 0.74 MG/DL  


 


Random Glucose 93 MG/DL  


 


Total Protein 6.7 GM/DL  


 


Albumin 3.3 GM/DL  


 


Calcium Level 8.2 MG/DL  


 


Alkaline Phosphatase 61 U/L  


 


Aspartate Amino Transf


(AST/SGOT) 34 U/L 


  


 


 


Alanine Aminotransferase


(ALT/SGPT) 29 U/L 


  


 


 


Total Bilirubin 0.4 MG/DL  


 


Sodium Level 140 MEQ/L  


 


Potassium Level 3.8 MEQ/L  


 


Chloride Level 108 MEQ/L  


 


Carbon Dioxide Level 24.4 MEQ/L  


 


Anion Gap 8 MEQ/L  


 


Estimat Glomerular Filtration


Rate 79 ML/MIN 


  


 


 


Lactic Acid Level 0.5 mmol/L  


 


Lipase 146 U/L  


 


Urine Color  YELLOW 


 


Urine Turbidity  CLEAR 


 


Urine pH  6.5 


 


Urine Specific Gravity  1.010 


 


Urine Protein  TRACE mg/dL 


 


Urine Glucose (UA)  NEG mg/dL 


 


Urine Ketones  10 mg/dL 


 


Urine Occult Blood  SMALL 


 


Urine Nitrite  NEG 


 


Urine Bilirubin  NEG 


 


Urine Urobilinogen


  


  LESS THAN 2.0


MG/DL


 


Urine Leukocyte Esterase  TRACE 


 


Urine RBC  26 /hpf 


 


Urine WBC  3 /hpf 


 


Urine Squamous Epithelial


Cells 


  3 /hpf 


 


 


Urine Amorphous Sediment  RARE 


 


Urine Mucus  FEW /lpf 


 


Microscopic Urinalysis Comment


  


  CULT NOT


INDICATED








Imaging





Last Impressions








Abdomen/Pelvis CT 3/28/18 0000 Signed





Impressions: 





 Service Date/Time:  Wednesday, March 28, 2018 01:04 - CONCLUSION:  1. Small 





 amount of pelvic free fluid. 2. Normal appendix. 3. Gaseous distention of the 





 ascending colon without obstruction.     Jared Peralta MD 


 


Abdomen X-Ray 3/27/18 2345 Signed





Impressions: 





 Service Date/Time:  Wednesday, March 28, 2018 00:02 - CONCLUSION:  Mild 

gaseous 





 distention of bowel loops in right abdomen without obstruction. Round 





 benign-appearing calcification to the left of L4 vertebral body.     Jared Peralta MD 








Objective Remarks


GENERAL: AWAKE ALERT AND ORIENTED X3 IN MODERATED DISTRESS


SKIN: Warm and dry.


HEAD: Atraumatic. Normocephalic. 


EYES: Pupils equal and round. No scleral icterus. No injection or drainage. EOMI


ENT: No nasal bleeding or discharge.  Mucous membranes pink and moist. TONGUE 

MIDLINE


NECK: Trachea midline. No JVD. SUPPLE


CARDIOVASCULAR: Regular rate and rhythm.  S1, S2 NO S3 OR S4 NO HEAVE OR THRILL


RESPIRATORY: No accessory muscle use. Clear to auscultation. Breath sounds 

equal bilaterally. 


GASTROINTESTINAL: Abdomen soft,. Hepatic and splenic margins not palpable. MILD/

MODERATE ABDOMINAL TENDERNESS- DISTENDED


MUSCULOSKELETAL: Extremities without clubbing, cyanosis, or edema. No obvious 

deformities. 


NEUROLOGICAL: Awake and alert. No obvious cranial nerve deficits.  Motor 

grossly within normal limits. Five out of 5 muscle strength in the arms and 

legs.  Normal speech.


PSYCHIATRIC: INAppropriate mood and affect; insight and judgment ABnormal.


Procedures


NONE


Medications and IVs





Current Medications


Sodium Chloride (NS Flush) 2 ml UNSCH  PRN IV FLUSH FLUSH AFTER USING IV ACCESS 

Last administered on 3/28/18at 00:50;  Start 3/27/18 at 23:45;  Stop 3/28/18 at 

02:40;  Status DC


Sodium Chloride 1,000 ml @  999 mls/hr BOLUS  ONCE IV  Last administered on 3/28

/18at 00:02;  Start 3/28/18 at 00:00;  Stop 3/28/18 at 01:00;  Status DC


Ondansetron HCl (Zofran Inj) 4 mg ONCE  ONCE IV PUSH  Last administered on 3/28/

18at 00:02;  Start 3/28/18 at 00:00;  Stop 3/28/18 at 00:01;  Status DC


Ondansetron HCl (Zofran Inj) 4 mg STK-MED ONCE .ROUTE ;  Start 3/27/18 at 23:55

;  Stop 3/27/18 at 23:56;  Status DC


Iohexol (Omnipaque 350 Inj) 90 ml STK-MED ONCE IVCONTRAST  Last administered on 

3/28/18at 01:15;  Start 3/28/18 at 01:15;  Stop 3/28/18 at 01:16;  Status DC


Hydromorphone HCl (Dilaudid Pf Inj) 1 mg ONCE  ONCE IV PUSH  Last administered 

on 3/28/18at 02:56;  Start 3/28/18 at 02:00;  Stop 3/28/18 at 02:01;  Status DC


Sodium Chloride 1,000 ml @  100 mls/hr Q10H IV  Last administered on 3/28/18at 

03:37;  Start 3/28/18 at 02:27;  Stop 3/28/18 at 11:13;  Status DC


Sodium Chloride (NS Flush) 2 ml UNSCH  PRN IV FLUSH FLUSH AFTER USING IV ACCESS

;  Start 3/28/18 at 02:30;  Stop 3/28/18 at 11:11;  Status DC


Sodium Chloride (NS Flush) 2 ml BID IV FLUSH ;  Start 3/28/18 at 02:30;  Stop 3/

28/18 at 11:11;  Status DC


Ondansetron HCl (Zofran Inj) 4 mg Q6H  PRN IVP NAUSEA OR VOMITING Last 

administered on 3/28/18at 07:57;  Start 3/28/18 at 02:30;  Stop 3/28/18 at 11:10

;  Status DC


Naloxone HCl (Narcan Inj) 0.4 mg UNSCH  PRN IV PUSH SEE LABEL COMMENTS;  Start 3

/28/18 at 02:30;  Stop 3/28/18 at 11:12;  Status DC


Hydromorphone HCl (Dilaudid Pf Inj) 1 mg Q4H  PRN IV PAIN GREATER THAN 4 Last 

administered on 3/29/18at 03:02;  Start 3/28/18 at 02:45


Prochlorperazine Edisylate (Compazine Inj) 5 mg Q4H  PRN IV PUSH NAUSEA OR 

VOMITING;  Start 3/28/18 at 02:30;  Stop 3/28/18 at 11:40;  Status DC


Albuterol/ Ipratropium (Duoneb Neb) 1 ampule Q4HR NEB  PRN NEB SOB/WHEEZING 

Last administered on 3/28/18at 15:10;  Start 3/28/18 at 02:45;  Stop 3/28/18 at 

11:10;  Status DC


Enalaprilat (Vasotec Inj) 2.5 mg Q6H  PRN IV PUSH SEE LABEL COMMENTS;  Start 3/

28/18 at 02:45


Sodium Chloride 1,000 ml @  999 mls/hr BOLUS  ONCE IV  Last administered on 3/28

/18at 04:21;  Start 3/28/18 at 03:45;  Stop 3/28/18 at 04:45;  Status DC


Albuterol Sulfate (Albuterol Neb) 2.5 mg TID NEB  PRN NEB SHORTNESS OF BREATH 

Last administered on 3/29/18at 09:53;  Start 3/28/18 at 10:15


Amlodipine Besylate (Norvasc) 5 mg DAILY PO  Last administered on 3/29/18at 09:

07;  Start 3/28/18 at 11:00


Atorvastatin Calcium (Lipitor) 80 mg HS PO  Last administered on 3/28/18at 22:01

;  Start 3/28/18 at 21:00


Cetirizine HCl (ZyrTEC) 10 mg DAILY PO  Last administered on 3/29/18at 09:07;  

Start 3/28/18 at 12:00


Docusate Calcium (Surfak) 240 mg HS PO ;  Start 3/28/18 at 21:00;  Stop 3/28/18 

at 21:00;  Status DC


Non-Formulary Medication 1 tab DAILY CHEW ;  Start 3/28/18 at 10:15;  Stop 3/28/

18 at 11:38;  Status DC


Non-Formulary Medication 500 mg DAILY PO ;  Start 3/28/18 at 10:15;  Stop 3/28/

18 at 11:14;  Status DC


Lisinopril (Prinivil) 40 mg BID PO  Last administered on 3/29/18at 09:07;  

Start 3/28/18 at 11:30


Multivitamins/ Minerals Therapeutic (Theragran M Tab) 1 tab DAILY PO  Last 

administered on 3/29/18at 09:07;  Start 3/28/18 at 12:00


Sodium Chloride 1,000 ml @  100 mls/hr Q10H IV  Last administered on 3/29/18at 

07:00;  Start 3/28/18 at 11:00


Sodium Chloride (NS Flush) 2 ml UNSCH  PRN IV FLUSH FLUSH AFTER USING IV ACCESS

;  Start 3/28/18 at 10:15


Sodium Chloride (NS Flush) 2 ml BID IV FLUSH ;  Start 3/28/18 at 21:00


Acetaminophen (Tylenol) 650 mg Q4H  PRN PO TEMP > 100.4;  Start 3/28/18 at 10:15


Ondansetron HCl (Zofran Inj) 4 mg Q6H  PRN IVP NAUSEA OR VOMITING Last 

administered on 3/29/18at 08:58;  Start 3/28/18 at 10:15


Metoclopramide HCl (Reglan Inj) 5 mg Q6H  PRN IV PUSH NAUSEA OR VOMITING;  

Start 3/28/18 at 10:15


Zolpidem Tartrate (Ambien) 5 mg HS  PRN PO INSOMNIA;  Start 3/28/18 at 10:15


Acetaminophen (Tylenol) 650 mg Q6H  PRN PO PAIN SCALE 1 TO 2;  Start 3/28/18 at 

10:15


Oxycodone/ Acetaminophen (Percocet  5-325 Mg) 1 tab Q6H  PRN PO PAIN SCALE 3 TO 

5 Last administered on 3/29/18at 09:08;  Start 3/28/18 at 10:15


Oxycodone/ Acetaminophen (Percocet  Mg) 1 tab Q6H  PRN PO PAIN SCALE 6 TO 

10;  Start 3/28/18 at 10:15


Naloxone HCl (Narcan Inj) 0.4 mg UNSCH  PRN IV PUSH SEE LABEL COMMENTS;  Start 3

/28/18 at 10:15


Senna/Docusate Sodium (Maryann-Colace) 1 tab BID PO  Last administered on 3/28/

18at 22:01;  Start 3/28/18 at 21:00


Magnesium Hydroxide (Milk Of Magnesia Liq) 30 ml Q12H  PRN PO Mild constipation

;  Start 3/28/18 at 10:15


Sennosides (Senokot) 17.2 mg Q12H  PRN PO Moderate constipation;  Start 3/28/18 

at 10:15


Bisacodyl (Dulcolax Supp) 10 mg DAILY  PRN RECTAL SEVERE CONSITIPATION;  Start 3

/28/18 at 10:15


Lactulose (Lactulose Liq) 30 ml DAILY  PRN PO SEVERE CONSITIPATION;  Start 3/28/

18 at 10:15


Nicotine (Habitrol 14 Mg Patch.24 Hr) 1 patch ONCE  ONCE T-DERMAL ;  Start 3/28/

18 at 12:00;  Stop 3/28/18 at 12:01;  Status DC


Nicotine (Habitrol 14 Mg Patch.24 Hr) 1 patch DAILY T-DERMAL ;  Start 3/29/18 

at 09:00


Miscellaneous Information 1 DAILY T-DERMAL ;  Start 3/29/18 at 09:00


Magnesium Citrate (Citroma Liq) 300 ml ONCE  ONCE PO  Last administered on 3/28/

18at 18:47;  Start 3/28/18 at 16:00;  Stop 3/28/18 at 16:01;  Status DC


Magnesium Citrate (Citroma Liq) 300 ml ONCE  ONCE PO  Last administered on 3/28/

18at 21:08;  Start 3/28/18 at 18:00;  Stop 3/28/18 at 18:01;  Status DC


Lactated Ringer's 1,000 ml @  30 mls/hr Q24H PRN IV SEE LABEL COMMENTS;  Start 3

/29/18 at 02:15;  Stop 4/1/18 at 02:14


Sodium Chloride 500 ml @  30 mls/hr [id number] PRN IV SEE LABEL COMMENTS;  Start 3/

29/18 at 02:15;  Stop 4/1/18 at 02:14


Povidone Iodine (Betadine 5% Antisepsis Kit) 1 applic ON CALL  PRN EACH NARE 

SEE LABEL COMMENTS;  Start 3/29/18 at 02:15;  Stop 4/1/18 at 02:14


Chlorhexidine Gluconate (Chlorhexidine 2% Cloth) 3 pack ON CALL  PRN TOPICAL 

SEE LABEL COMMENTS;  Start 3/29/18 at 02:15;  Stop 4/1/18 at 02:14





A/P


Assessment and Plan


Abdominal pain and bloating we will consult gastroenterology to undergo studies 

with EGD and colonoscopy





Constipation might need a colonoscopy





Nausea vomiting continue on antiemetics continue on antiemetics for EGD and 

colonoscopy





Leukocytosis a.m. labs





Tobacco abuse recommend smoking cessation





History of right lung resection for cancer





History of hysterectomy





Abdominal pain continue on pain control





Discussed with patient and RN and family


Discharge Planning


Pending clearance by gastroenterology











Eron Greenfield DO Mar 29, 2018 14:23

## 2018-03-29 NOTE — EKG
Date Performed: 03/28/2018       Time Performed: 20:43:31

 

PTAGE:      67 years

 

EKG:      Sinus rhythm 

 

 WITH OCCASIONAL SUPRAVENTRICULAR PREMATURE COMPLEXES BORDERLINE ECG

 

PREVIOUS TRACING       : 04/07/2017 12.27 Since the previous tracing, no significant change noted

 

DOCTOR:   Handy Prasad  Interpretating Date/Time  03/29/2018 12:23:30

## 2018-03-30 VITALS
SYSTOLIC BLOOD PRESSURE: 93 MMHG | RESPIRATION RATE: 18 BRPM | TEMPERATURE: 97.2 F | DIASTOLIC BLOOD PRESSURE: 51 MMHG | OXYGEN SATURATION: 93 % | HEART RATE: 84 BPM

## 2018-03-30 VITALS
TEMPERATURE: 97.8 F | HEART RATE: 84 BPM | OXYGEN SATURATION: 97 % | SYSTOLIC BLOOD PRESSURE: 120 MMHG | DIASTOLIC BLOOD PRESSURE: 59 MMHG | RESPIRATION RATE: 18 BRPM

## 2018-03-30 VITALS
TEMPERATURE: 98.4 F | DIASTOLIC BLOOD PRESSURE: 73 MMHG | HEART RATE: 84 BPM | OXYGEN SATURATION: 98 % | SYSTOLIC BLOOD PRESSURE: 123 MMHG | RESPIRATION RATE: 16 BRPM

## 2018-03-30 VITALS — OXYGEN SATURATION: 96 %

## 2018-03-30 LAB
ACANTHOCYTES BLD QL SMEAR: (no result)
ALBUMIN SERPL-MCNC: 2.1 GM/DL (ref 3.4–5)
ALP SERPL-CCNC: 43 U/L (ref 45–117)
ALT SERPL-CCNC: 18 U/L (ref 10–53)
AST SERPL-CCNC: 25 U/L (ref 15–37)
BILIRUB SERPL-MCNC: 0.2 MG/DL (ref 0.2–1)
BUN SERPL-MCNC: 16 MG/DL (ref 7–18)
CALCIUM SERPL-MCNC: 6.9 MG/DL (ref 8.5–10.1)
CALCIUM TP COR SERPL-MCNC: 8.2 MG/DL (ref 8.5–10.1)
CHLORIDE SERPL-SCNC: 110 MEQ/L (ref 98–107)
CREAT SERPL-MCNC: 0.67 MG/DL (ref 0.5–1)
ERYTHROCYTE [DISTWIDTH] IN BLOOD BY AUTOMATED COUNT: 14.2 % (ref 11.6–17.2)
GFR SERPLBLD BASED ON 1.73 SQ M-ARVRAT: 88 ML/MIN (ref 89–?)
GLUCOSE SERPL-MCNC: 226 MG/DL (ref 74–106)
HCO3 BLD-SCNC: 31.1 MEQ/L (ref 21–32)
HCT VFR BLD CALC: 31 % (ref 35–46)
HGB BLD-MCNC: 10.5 GM/DL (ref 11.6–15.3)
LYMPHOCYTES: 2 % (ref 9–44)
MAGNESIUM SERPL-MCNC: 2.9 MG/DL (ref 1.5–2.5)
MCH RBC QN AUTO: 29.4 PG (ref 27–34)
MCHC RBC AUTO-ENTMCNC: 33.7 % (ref 32–36)
MCV RBC AUTO: 87.2 FL (ref 80–100)
MONOCYTES: 1 % (ref 0–8)
NEUTS BAND # BLD MANUAL: 15.5 TH/MM3 (ref 1.8–7.7)
NEUTS BAND NFR BLD: 10 % (ref 0–6)
NEUTS SEG NFR BLD MANUAL: 87 % (ref 16–70)
OVALOCYTES BLD QL SMEAR: (no result)
PHOSPHATE SERPL-MCNC: 1.7 MG/DL (ref 2.5–4.9)
PLATELET # BLD: 231 TH/MM3 (ref 150–450)
PMV BLD AUTO: 7.4 FL (ref 7–11)
PROT SERPL-MCNC: 4.7 GM/DL (ref 6.4–8.2)
RBC # BLD AUTO: 3.56 MIL/MM3 (ref 4–5.3)
SODIUM SERPL-SCNC: 145 MEQ/L (ref 136–145)
WBC # BLD AUTO: 16 TH/MM3 (ref 4–11)

## 2018-03-30 RX ADMIN — NICOTINE SCH PATCH: 14 PATCH, EXTENDED RELEASE TOPICAL at 08:20

## 2018-03-30 RX ADMIN — Medication SCH ML: at 08:15

## 2018-03-30 RX ADMIN — ATORVASTATIN CALCIUM SCH MG: 80 TABLET, FILM COATED ORAL at 21:00

## 2018-03-30 RX ADMIN — DEXTROSE MONOHYDRATE, SODIUM CHLORIDE, AND POTASSIUM CHLORIDE SCH MLS/HR: 50; 9; 1.49 INJECTION, SOLUTION INTRAVENOUS at 06:18

## 2018-03-30 RX ADMIN — DEXTROSE MONOHYDRATE, SODIUM CHLORIDE, AND POTASSIUM CHLORIDE SCH MLS/HR: 50; 9; 1.49 INJECTION, SOLUTION INTRAVENOUS at 21:04

## 2018-03-30 RX ADMIN — ALBUTEROL SULFATE PRN MG: 2.5 SOLUTION RESPIRATORY (INHALATION) at 12:40

## 2018-03-30 RX ADMIN — LISINOPRIL SCH MG: 20 TABLET ORAL at 21:00

## 2018-03-30 RX ADMIN — SODIUM CHLORIDE SCH MLS/HR: 900 INJECTION, SOLUTION INTRAVENOUS at 03:34

## 2018-03-30 RX ADMIN — IPRATROPIUM BROMIDE AND ALBUTEROL SULFATE SCH AMPULE: .5; 3 SOLUTION RESPIRATORY (INHALATION) at 20:17

## 2018-03-30 RX ADMIN — SODIUM CHLORIDE SCH MLS/HR: 900 INJECTION, SOLUTION INTRAVENOUS at 21:04

## 2018-03-30 RX ADMIN — MORPHINE SULFATE SCH MG: 1 INJECTION, SOLUTION INTRAVENOUS at 15:11

## 2018-03-30 RX ADMIN — MULTIPLE VITAMINS W/ MINERALS TAB SCH TAB: TAB at 09:00

## 2018-03-30 RX ADMIN — Medication SCH ML: at 21:00

## 2018-03-30 RX ADMIN — SODIUM CHLORIDE SCH MLS/HR: 900 INJECTION, SOLUTION INTRAVENOUS at 11:30

## 2018-03-30 RX ADMIN — MORPHINE SULFATE SCH MG: 1 INJECTION, SOLUTION INTRAVENOUS at 00:50

## 2018-03-30 RX ADMIN — SODIUM CHLORIDE SCH MG: 900 INJECTION, SOLUTION INTRAVENOUS at 08:15

## 2018-03-30 RX ADMIN — CETIRIZINE HYDROCHLORIDE SCH MG: 10 TABLET, FILM COATED ORAL at 08:15

## 2018-03-30 RX ADMIN — SODIUM CHLORIDE SCH MG: 900 INJECTION, SOLUTION INTRAVENOUS at 21:05

## 2018-03-30 RX ADMIN — POTASSIUM CHLORIDE PRN MLS/HR: 200 INJECTION, SOLUTION INTRAVENOUS at 10:40

## 2018-03-30 RX ADMIN — LISINOPRIL SCH MG: 20 TABLET ORAL at 08:20

## 2018-03-30 RX ADMIN — ALVIMOPAN SCH MG: 12 CAPSULE ORAL at 21:05

## 2018-03-30 RX ADMIN — ALVIMOPAN SCH MG: 12 CAPSULE ORAL at 08:15

## 2018-03-30 NOTE — HHI.PR
Subjective


Remarks


Written by Zari Nascimento, acting as scribe for Dr. Matias on 3/30/18 at 17:

34. 





Follow up on patient with cecal volvulus s/p laparotomy.  Patient seen and 

examined.  She complains of chronic shortness of breath secondary to hx of 

lobectomies who has not been on her inhaler medications.  She follows with Dr. DIA Narvaez as an outpatient.  She reports postoperative pain.  She is not moving 

her bowels or passing gas.  She is afebrile. VSS.





Objective


Vitals





Vital Signs








  Date Time  Temp Pulse Resp B/P (MAP) Pulse Ox O2 Delivery O2 Flow Rate FiO2


 


3/30/18 16:33 97.8 84 18 120/59 (79) 97   


 


3/30/18 15:11   14     


 


3/30/18 13:13 97.2 84 18 93/51 (65) 93   


 


3/30/18 11:30  84 18 101/58 (72) 96 Nasal Cannula 2 


 


3/30/18 10:00  80 18 96/55 (69) 96 Nasal Cannula 2 


 


3/30/18 07:30 97.6 88 18 101/57 (72) 98 Nasal Cannula 2 


 


3/30/18 06:00 98.2 78 18 109/54 (72) 99 Nasal Cannula 2 


 


3/30/18 05:00  81 21 111/58 (75) 99 Nasal Cannula 2 


 


3/30/18 04:00 97.8 84 16 110/56 (74) 98 Nasal Cannula 2 


 


3/30/18 03:00  86 20 117/58 (77) 99 Nasal Cannula 2 


 


3/30/18 02:00  89 20 119/60 (79) 99 Nasal Cannula 2 


 


3/30/18 01:30  88 12 133/65 (87) 95 Nasal Cannula 2 


 


3/30/18 01:15  82 12 123/64 (83) 95 Nasal Cannula 2 


 


3/30/18 01:00  92 20 114/66 (82) 94 Nasal Cannula 2 


 


3/30/18 00:50   26     


 


3/30/18 00:45  84 16 148/70 (96) 100 Nasal Cannula 4 


 


3/30/18 00:30  86 15 141/69 (93) 100 Nasal Cannula 4 


 


3/30/18 00:15  87 20 142/69 (93) 100 Nasal Cannula 4 


 


3/30/18 00:00  81 15 146/68 (94) 100 Nasal Cannula 4 


 


3/29/18 23:45  84 19 151/73 (99) 100 Nasal Cannula 4 


 


3/29/18 23:39 97.6 88 19 149/74 (99) 100 Simple Mask 8 


 


3/29/18 21:37  84      


 


3/29/18 21:35      Room Air  


 


3/29/18 21:35 98.2 85 17 137/74 (95) 92   


 


3/29/18 20:44 98.2 87 16 119/73 (88) 94   














I/O      


 


 3/29/18 3/29/18 3/29/18 3/30/18 3/30/18 3/30/18





 07:00 15:00 23:00 07:00 15:00 23:00


 


Intake Total   650 ml 2024 ml  


 


Output Total    690 ml 25 ml 


 


Balance   650 ml 1334 ml -25 ml 


 


      


 


Intake Oral    100 ml  


 


IV Total    924 ml  


 


Other   650 ml 1000 ml  


 


Output Urine Total    490 ml  


 


Gastric Drainage Total    150 ml 25 ml 


 


Estimated Blood Loss    50 ml  








Result Diagram:  


3/30/18 0727                                                                   

             3/30/18 0727





Imaging





Last Impressions








Abdomen/Pelvis CT 3/28/18 0000 Signed





Impressions: 





 Service Date/Time:  Wednesday, March 28, 2018 01:04 - CONCLUSION:  1. Small 





 amount of pelvic free fluid. 2. Normal appendix. 3. Gaseous distention of the 





 ascending colon without obstruction.     Jared Peralta MD 


 


Abdomen X-Ray 3/27/18 2345 Signed





Impressions: 





 Service Date/Time:  Wednesday, March 28, 2018 00:02 - CONCLUSION:  Mild 

gaseous 





 distention of bowel loops in right abdomen without obstruction. Round 





 benign-appearing calcification to the left of L4 vertebral body.     Jared Peralta MD 








Objective Remarks


GENERAL: Thin  female, INAD.  Awake and alert.   at bedside.


SKIN: Warm and dry.


HEAD: Atraumatic. Normocephalic. 


EYES: Pupils equal and round. No scleral icterus. No injection or drainage. 


ENT: No nasal bleeding or discharge.  Mucous membranes pink and moist.  NG tube 

right nare.


NECK: Trachea midline. 


CARDIOVASCULAR: Regular rate and rhythm.  


RESPIRATORY: No accessory muscle use. Clear to auscultation. Breath sounds 

equal bilaterally. 


GASTROINTESTINAL: Abdomen postop binder in place, hypoactive BS.


MUSCULOSKELETAL: Extremities without clubbing, cyanosis, or edema. No obvious 

deformities. 


NEUROLOGICAL: Awake and alert. No obvious cranial nerve deficits.  Motor 

grossly within normal limits. Normal speech.


PSYCHIATRIC: Appropriate mood and affect; insight and judgment normal.


Procedures


s/p exp laparotomy and right colectomy by Dr. Valles


Medications and IVs





Current Medications








 Medications


  (Trade)  Dose


 Ordered  Sig/Trung


 Route  Start Time


 Stop Time Status Last Admin


 


  (Dilaudid Pf Inj)  1 mg  Q4H  PRN


 IV  3/28/18 02:45


    3/29/18 03:02


 


 


  (Albuterol Neb)  2.5 mg  TID NEB  PRN


 NEB  3/28/18 10:15


    3/30/18 12:40


 


 


  (Norvasc)  5 mg  DAILY


 PO  3/28/18 11:00


    3/29/18 09:07


 


 


  (Lipitor)  80 mg  HS


 PO  3/28/18 21:00


    3/28/18 22:01


 


 


  (ZyrTEC)  10 mg  DAILY


 PO  3/28/18 12:00


    3/30/18 08:15


 


 


  (Prinivil)  40 mg  BID


 PO  3/28/18 11:30


    3/29/18 09:07


 


 


  (Theragran M Tab)  1 tab  DAILY


 PO  3/28/18 12:00


    3/29/18 09:07


 


 


  (NS Flush)  2 ml  UNSCH  PRN


 IV FLUSH  3/28/18 10:15


     


 


 


  (NS Flush)  2 ml  BID


 IV FLUSH  3/28/18 21:00


    3/30/18 08:15


 


 


  (Tylenol)  650 mg  Q4H  PRN


 PO  3/28/18 10:15


     


 


 


  (Zofran Inj)  4 mg  Q6H  PRN


 IVP  3/28/18 10:15


    3/29/18 08:58


 


 


  (Reglan Inj)  5 mg  Q6H  PRN


 IV PUSH  3/28/18 10:15


     


 


 


  (Ambien)  5 mg  HS  PRN


 PO  3/28/18 10:15


     


 


 


  (Percocet  5-325


 Mg)  1 tab  Q6H  PRN


 PO  3/28/18 10:15


    3/29/18 09:08


 


 


  (Percocet 


 Mg)  1 tab  Q6H  PRN


 PO  3/28/18 10:15


    3/29/18 18:04


 


 


  (Habitrol 14 Mg


 Patch.24 Hr)  1 patch  DAILY


 T-DERMAL  3/29/18 09:00


     


 


 


 Miscellaneous


 Information  1  DAILY


 T-DERMAL  3/29/18 09:00


     


 


 


  (Betadine 5%


 Antisepsis Kit)  1 applic  ON CALL  PRN


 EACH NARE  3/29/18 20:45


 4/1/18 20:44   


 


 


  (Chlorhexidine


 2% Cloth)  3 pack  ON CALL  PRN


 TOPICAL  3/29/18 20:45


 4/1/18 20:44   


 


 


 Potassium


 Chloride/Dextrose/


 Sod Cl  1,000 ml @ 


 100 mls/hr  Q10H


 IV  3/29/18 23:35


    3/30/18 06:18


 


 


  (Toradol Inj)  30 mg  Q6H  PRN


 IVP  3/29/18 23:45


 4/1/18 23:44   


 


 


  (Entereg)  12 mg  BID


 PO  3/29/18 23:45


 4/5/18 09:01  3/30/18 08:15


 


 


  (Reglan Inj)  10 mg  Q12HR


 IVS  3/30/18 09:00


    3/30/18 08:15


 


 


  (Vasotec Inj)  1.25 mg  Q4H  PRN


 IV PUSH  3/29/18 23:45


     


 


 


  (Vasotec Inj)  2.5 mg  Q6H  PRN


 IV PUSH  3/29/18 23:45


     


 


 


  (Chloraseptic


 Dewey)  1 lozenge  UNSCH  PRN


 BUCCAL  3/29/18 23:45


     


 


 


 Potassium Chloride  100 ml @ 


 50 mls/hr  UNSCH  PRN


 IV  3/29/18 23:45


    3/30/18 10:40


 


 


 Potassium Chloride  100 ml @ 


 25 mls/hr  UNSCH  PRN


 IV  3/29/18 23:45


     


 


 


  (Narcan Inj)  0.4 mg  UNSCH  PRN


 IV PUSH  3/29/18 23:45


     


 


 


  (Morphine 1 Mg/


 ml PCA)  30 mg  UNSCH


 IV  3/29/18 23:45


    3/30/18 15:11


 


 


 PCA Dosage


 Infused (Pha)  1  Q8HR


 .XX  3/29/18 23:45


     


 


 


 Miscellaneous


 Information  ALL


 NURSING


 DEPARTME...  UNSCH  PRN


 .XX  3/29/18 23:45


 3/30/18 23:44   


 


 


 Cefazolin Sodium


 1000 mg/Sodium


 Chloride  100 ml @ 


 200 mls/hr  Q8H


 IV  3/30/18 14:00


 4/2/18 13:59  3/30/18 14:44


 


 


 Metronidazole  100 ml @ 


 100 mls/hr  Q8H


 IV  3/30/18 20:00


 4/2/18 19:59   


 











A/P


Assessment and Plan


//Cecal volvulus


   -s/p exploratory laparotomy and right colectomy performed by Dr. Valles


   -pain management per surgery


   -PT eval/tx


   -continue NGT per surgery team


   -NPO.  Continue on IVF.


   -follow up on path results





//Leukocytosis


   -patient is afebrile


   -continue on IV abx per surgery team - Ancef and Flagyl IV


   -monitor white count





//Anemia secondary to anemia of acute blood loss/postop


   -no active bleeding


   -continue to monitor CBC





//Hx of lung cancer s/p resection


   -resume home bronchodilator therapy - patients  to bring in from home


   -scheduled Duonebs


   -IS at bedside, encourage hourly use.  Acapella


   -monitor respiratory status


   -supplemental oxygen to maintain O2 sats >92%





//Hypokalemia


   -on IV replacement


   -repeat BMP in am





//Hyperglycemia


   -obtain A1c


   -accuchek and ISS





//DVT prophylaxis


   -Bilateral scd/delilah hose


   -avoidance of chemical prophylaxis periprocedural


Discharge Planning


Pending postop recovery, CRS clearance for discharge





This note was transcribed by judy Nascimento.  I, Dr. Edil Matias 

personally performed the history, physical exam, and medical decision making; 

and confirmed the accuracy of the information in the transcribed note.





Authenticated by Dr. Edil Matias on 3/31/18 at 07:31.











Zari Nascimento Mar 30, 2018 17:52


Edil Matias MD Mar 31, 2018 07:27

## 2018-03-30 NOTE — [facility].PR
Subjective


Remarks


C/R Surg POD #1





afebrile, VSS


UO good


no SOB





Objective


-





Vital Signs








  Date Time  Temp Pulse Resp B/P (MAP) Pulse Ox O2 Delivery O2 Flow Rate FiO2


 


3/30/18 11:30  84 18 101/58 (72) 96 Nasal Cannula 2 


 


3/30/18 07:30 97.6       








Result Diagram:  


3/30/18 0727                                                                   

             3/30/18 0727





Objective Remarks


PE





alert





Abd - soft, flat, wound dry





A/P


Assessment and Plan


Imp:





stable post-op


OOB


decr IVF


Tx to floor











Sedrick Valles MD Mar 30, 2018 12:31

## 2018-03-30 NOTE — HHI.GIFU
Subjective


Remarks


Pt in bed in NAD.  She admits minimal abd pain- she feels better than when she 

came in.  s/p ex lap and right colectomy by CRS.  


 (Zaynab Wilson)





Objective


Vitals I&O





Vital Signs








  Date Time  Temp Pulse Resp B/P (MAP) Pulse Ox O2 Delivery O2 Flow Rate FiO2


 


3/30/18 11:30  84 18 101/58 (72) 96 Nasal Cannula 2 


 


3/30/18 10:00  80 18 96/55 (69) 96 Nasal Cannula 2 


 


3/30/18 07:30 97.6 88 18 101/57 (72) 98 Nasal Cannula 2 


 


3/30/18 06:00 98.2 78 18 109/54 (72) 99 Nasal Cannula 2 


 


3/30/18 05:00  81 21 111/58 (75) 99 Nasal Cannula 2 


 


3/30/18 04:00 97.8 84 16 110/56 (74) 98 Nasal Cannula 2 


 


3/30/18 03:00  86 20 117/58 (77) 99 Nasal Cannula 2 


 


3/30/18 02:00  89 20 119/60 (79) 99 Nasal Cannula 2 


 


3/30/18 01:30  88 12 133/65 (87) 95 Nasal Cannula 2 


 


3/30/18 01:15  82 12 123/64 (83) 95 Nasal Cannula 2 


 


3/30/18 01:00  92 20 114/66 (82) 94 Nasal Cannula 2 


 


3/30/18 00:50   26     


 


3/30/18 00:45  84 16 148/70 (96) 100 Nasal Cannula 4 


 


3/30/18 00:30  86 15 141/69 (93) 100 Nasal Cannula 4 


 


3/30/18 00:15  87 20 142/69 (93) 100 Nasal Cannula 4 


 


3/30/18 00:00  81 15 146/68 (94) 100 Nasal Cannula 4 


 


3/29/18 23:45  84 19 151/73 (99) 100 Nasal Cannula 4 


 


3/29/18 23:39 97.6 88 19 149/74 (99) 100 Simple Mask 8 


 


3/29/18 21:37  84      


 


3/29/18 21:35      Room Air  


 


3/29/18 21:35 98.2 85 17 137/74 (95) 92   


 


3/29/18 20:44 98.2 87 16 119/73 (88) 94   


 


3/29/18 16:55 97.0 80 18 116/77 (90) 94   














I/O      


 


 3/29/18 3/29/18 3/29/18 3/30/18 3/30/18 3/30/18





 07:00 15:00 23:00 07:00 15:00 23:00


 


Intake Total   650 ml 2024 ml  


 


Output Total    690 ml 25 ml 


 


Balance   650 ml 1334 ml -25 ml 


 


      


 


Intake Oral    100 ml  


 


IV Total    924 ml  


 


Other   650 ml 1000 ml  


 


Output Urine Total    490 ml  


 


Gastric Drainage Total    150 ml 25 ml 


 


Estimated Blood Loss    50 ml  








Laboratory





Laboratory Tests








Test


  3/30/18


07:27


 


White Blood Count 16.0 


 


Red Blood Count 3.56 


 


Hemoglobin 10.5 


 


Hematocrit 31.0 


 


Mean Corpuscular Volume 87.2 


 


Mean Corpuscular Hemoglobin 29.4 


 


Mean Corpuscular Hemoglobin


Concent 33.7 


 


 


Red Cell Distribution Width 14.2 


 


Platelet Count 231 


 


Mean Platelet Volume 7.4 


 


CBC Comment AUTO DIFF 


 


Differential Total Cells


Counted 100 


 


 


Neutrophils % (Manual) 87 


 


Band Neutrophils % 10 


 


Lymphocytes % 2 


 


Monocytes % 1 


 


Neutrophils # (Manual) 15.5 


 


Differential Comment


  FINAL DIFF


MANUAL


 


Platelet Estimate NORMAL 


 


Platelet Morphology Comment NORMAL 


 


Ovalocytes 1+ 


 


Acanthocytes OCC 


 


Blood Urea Nitrogen 16 


 


Creatinine 0.67 


 


Random Glucose 226 


 


Total Protein 4.7 


 


Albumin 2.1 


 


Calcium Level 6.9 


 


Phosphorus Level 1.7 


 


Magnesium Level 2.9 


 


Alkaline Phosphatase 43 


 


Aspartate Amino Transf


(AST/SGOT) 25 


 


 


Alanine Aminotransferase


(ALT/SGPT) 18 


 


 


Total Bilirubin 0.2 


 


Sodium Level 145 


 


Potassium Level 3.4 


 


Chloride Level 110 


 


Carbon Dioxide Level 31.1 


 


Anion Gap 4 


 


Estimat Glomerular Filtration


Rate 88 


 


 


Protein Corrected Calcium 8.2 








Imaging





Last Impressions








Abdomen/Pelvis CT 3/28/18 0000 Signed





Impressions: 





 Service Date/Time:  Wednesday, March 28, 2018 01:04 - CONCLUSION:  1. Small 





 amount of pelvic free fluid. 2. Normal appendix. 3. Gaseous distention of the 





 ascending colon without obstruction.     Jared Peralta MD 


 


Abdomen X-Ray 3/27/18 [id number] Signed





Impressions: 





 Service Date/Time:  Wednesday, March 28, 2018 00:02 - CONCLUSION:  Mild 

gaseous 





 distention of bowel loops in right abdomen without obstruction. Round 





 benign-appearing calcification to the left of L4 vertebral body.     Jared Peralta MD 








Physical Exam


HEENT: PERRL; normocephalic; atraumatic; no jaundice.  


CHEST:  CTA


CARDIAC:  RRR


ABDOMEN:  Soft, nondistended, mild diffuse TTP, BS+, abd binder   NGT


EXTREMITIES: No clubbing, cyanosis, or edema.


SKIN:  Normal; no rash; no jaundice.


CNS:  No focal deficits; alert and oriented times three.


 (Zaynab Wilson)





Assessment and Plan


Plan


Assessment:


- Reports of abdominal pain- states starts in low mid abdomen radiates to


  umbilicus and right side of abdomen. Reports diarrhea yesterday morning


  which stopped then she felt she needed to have a BM so she took a 


  stool softener with no BM and no relief of symptoms.  States history of


  "kink in her intestine" resolved on its own, denies surgery. 


  Ct abdomen and pelvis W IV contrast --> Small amount of pelvic free fluid.


  Normal appendix, Gaseous distention of the ascending colon without 


  obstruction. 


  Last colonoscopy 5 years ago, states polyps, due for repeat exam


  next month 


- Nausea and vomiting- one isolated episode- yesterday, now just dry


  heaving, has not eaten.  Denies hematemesis and coffee ground emesis.


  Has never had EGD.


  Of note, reports taking Ibuprofen frequently for back pain. 


3/30/18 s/p EGD and colonoscopy 3/29 found esohpagitis, gastritis, colon polyps

, colon torsion vs volvulus.  


  CRS consulted and she is s/p ex lap and right colectomy.  she feels better 

than when she came in.





Plan:


 - await bx


- diet and further mgmt per CRS


- f/u with GI after D/C


- supportive care





Pt has been seen and examined by myself and Dr. Galindo and this note is 

written on his behalf 


 (Zaynab Wilson)


Physician Comments


Patient seen and examined


Agree with above


Continue with current supportive care


Monitor labs


Case discussed with Dr. Valles apparently the patient did have colonic volvulus 

and had infarcted her small bowel


Further management as per the surgical service


Not much to add at this point from a GI perspective


We will sign off


 (Roger Galindo MD)











Zaynab Wilson Mar 30, 2018 12:38


Roger Galindo MD Mar 30, 2018 23:33

## 2018-03-30 NOTE — MB
cc:

Sedrick Valles MD, Andrew H MD

****

 

 

DATE:

03/29/2018

 

REASON FOR CONSULTATION:

Abdominal pain, colonic distention.

 

HISTORY OF PRESENT ILLNESS:

Ms. Fairchild is a 67-year-old female who has a history of bilateral lung 

cancer.  She has been known to have floppy bowel in the past.  

Recently, she has been having more right-sided abdominal pain with 

distention of the abdomen associated with nausea and vomiting.  This 

has been over the last 24-48 hours.  She has had very little, if any, 

oral intake and has been having some dry heaves and emesis.  Denies 

any fever.  No rectal bleeding.  No diarrhea.  Denies any significant 

hemorrhoidal prolapse and no melena.  Weight has gone down about 10 

pounds in the last week.  The patient was admitted several days ago 

with evaluation showing distention of the right colon and terminal 

ileum possibly consistent with a cecal volvulus.  Dr. Galindo did a 

partial colonoscopy today getting through the transverse colon, but 

not being able to intubate the right colon due to a twist consistent 

with a volvulus.

 

Please see the admitting history and physical and consultation for 

past medical and surgical history.

 

PERTINENT PHYSICAL:

GENERAL:  This is a very pleasant, thin female with signs of chronic 

illness.

HEENT:  Remarkable for somewhat pale, dry membranes.  Nonicteric 

sclerae.

NECK:  Supple without gross adenopathy.

CHEST:  Relatively diminished with crackles in the bases.

HEART:  Had a regular rhythm, slight tachycardia.

ABDOMEN:  Grossly distended fairly tight, tender to touch.  No rebound

or guarding or any masses appreciated.  Anal inspection revealed 

benign canal.  Digital exam revealed good tone.  No masses or 

tenderness and no blood on the finger.

EXTREMITIES:  No cyanosis or clubbing and minimal 1+ pedal edema.

 

LABORATORY STUDIES:

All reviewed.  CT scan did show unusual gas pattern with distention of

the right colon up above the liver and displacing the liver to the 

left side.  A fair amount of small bowel dilatation was also present. 

No free air and no significant ascites.

 

IMPRESSION:

A 67-year-old female with signs of possible cecal volvulus.  CT is 

somewhat difficult to understand due to the large swelling in the 

right colon without an obvious twist or volvulus trigger point.  I 

reviewed with the patient with her severe pain and abdominal 

distention, recommended bringing her to the operating room as soon as 

possible for definitive exploratory laparotomy and either untwisting 

of the bowel or possibly resection of the involved segment.  The 

risks, benefits, and alternatives were discussed.  She understands 

that there is a possibility that she may end up having a colostomy or 

ileostomy.

 

 

 

 

 

__________________________________

MD ALESSANDRO Miller/BILLY

D: 03/30/2018, 12:48 PM

T: 03/30/2018, 01:11 PM

Visit #: [id number]

Job #: [id number]

## 2018-03-31 VITALS — OXYGEN SATURATION: 92 %

## 2018-03-31 VITALS
OXYGEN SATURATION: 88 % | SYSTOLIC BLOOD PRESSURE: 121 MMHG | HEART RATE: 93 BPM | RESPIRATION RATE: 17 BRPM | TEMPERATURE: 98.4 F | DIASTOLIC BLOOD PRESSURE: 69 MMHG

## 2018-03-31 VITALS
OXYGEN SATURATION: 91 % | HEART RATE: 97 BPM | SYSTOLIC BLOOD PRESSURE: 141 MMHG | TEMPERATURE: 98.6 F | RESPIRATION RATE: 22 BRPM | DIASTOLIC BLOOD PRESSURE: 70 MMHG

## 2018-03-31 VITALS
OXYGEN SATURATION: 94 % | RESPIRATION RATE: 16 BRPM | DIASTOLIC BLOOD PRESSURE: 70 MMHG | HEART RATE: 87 BPM | SYSTOLIC BLOOD PRESSURE: 132 MMHG | TEMPERATURE: 98 F

## 2018-03-31 VITALS
RESPIRATION RATE: 16 BRPM | HEART RATE: 93 BPM | OXYGEN SATURATION: 98 % | TEMPERATURE: 97.8 F | DIASTOLIC BLOOD PRESSURE: 72 MMHG | SYSTOLIC BLOOD PRESSURE: 146 MMHG

## 2018-03-31 VITALS
TEMPERATURE: 98.4 F | DIASTOLIC BLOOD PRESSURE: 68 MMHG | HEART RATE: 86 BPM | SYSTOLIC BLOOD PRESSURE: 127 MMHG | OXYGEN SATURATION: 94 % | RESPIRATION RATE: 18 BRPM

## 2018-03-31 VITALS
DIASTOLIC BLOOD PRESSURE: 67 MMHG | SYSTOLIC BLOOD PRESSURE: 140 MMHG | TEMPERATURE: 98.1 F | OXYGEN SATURATION: 92 % | HEART RATE: 94 BPM | RESPIRATION RATE: 17 BRPM

## 2018-03-31 VITALS — OXYGEN SATURATION: 93 %

## 2018-03-31 VITALS — OXYGEN SATURATION: 96 %

## 2018-03-31 LAB
BASOPHILS # BLD AUTO: 0.1 TH/MM3 (ref 0–0.2)
BASOPHILS NFR BLD: 0.4 % (ref 0–2)
BUN SERPL-MCNC: 7 MG/DL (ref 7–18)
CALCIUM SERPL-MCNC: 7.1 MG/DL (ref 8.5–10.1)
CALCIUM TP COR SERPL-MCNC: 8.1 MG/DL (ref 8.5–10.1)
CHLORIDE SERPL-SCNC: 112 MEQ/L (ref 98–107)
CREAT SERPL-MCNC: 0.49 MG/DL (ref 0.5–1)
EOSINOPHIL # BLD: 0 TH/MM3 (ref 0–0.4)
EOSINOPHIL NFR BLD: 0 % (ref 0–4)
ERYTHROCYTE [DISTWIDTH] IN BLOOD BY AUTOMATED COUNT: 14.5 % (ref 11.6–17.2)
GFR SERPLBLD BASED ON 1.73 SQ M-ARVRAT: 126 ML/MIN (ref 89–?)
GLUCOSE SERPL-MCNC: 127 MG/DL (ref 74–106)
HCO3 BLD-SCNC: 30.6 MEQ/L (ref 21–32)
HCT VFR BLD CALC: 30.6 % (ref 35–46)
HGB BLD-MCNC: 10.3 GM/DL (ref 11.6–15.3)
LYMPHOCYTES # BLD AUTO: 1.8 TH/MM3 (ref 1–4.8)
LYMPHOCYTES NFR BLD AUTO: 10.6 % (ref 9–44)
MCH RBC QN AUTO: 29.9 PG (ref 27–34)
MCHC RBC AUTO-ENTMCNC: 33.7 % (ref 32–36)
MCV RBC AUTO: 88.7 FL (ref 80–100)
MONOCYTE #: 0.9 TH/MM3 (ref 0–0.9)
MONOCYTES NFR BLD: 5.6 % (ref 0–8)
NEUTROPHILS # BLD AUTO: 13.9 TH/MM3 (ref 1.8–7.7)
NEUTROPHILS NFR BLD AUTO: 83.4 % (ref 16–70)
PLATELET # BLD: 228 TH/MM3 (ref 150–450)
PMV BLD AUTO: 7.6 FL (ref 7–11)
PROT SERPL-MCNC: 5.2 GM/DL (ref 6.4–8.2)
RBC # BLD AUTO: 3.45 MIL/MM3 (ref 4–5.3)
SODIUM SERPL-SCNC: 148 MEQ/L (ref 136–145)
WBC # BLD AUTO: 16.7 TH/MM3 (ref 4–11)

## 2018-03-31 RX ADMIN — SODIUM CHLORIDE SCH MG: 900 INJECTION, SOLUTION INTRAVENOUS at 08:02

## 2018-03-31 RX ADMIN — POTASSIUM CHLORIDE PRN MLS/HR: 200 INJECTION, SOLUTION INTRAVENOUS at 20:36

## 2018-03-31 RX ADMIN — MORPHINE SULFATE SCH MG: 1 INJECTION, SOLUTION INTRAVENOUS at 07:55

## 2018-03-31 RX ADMIN — SODIUM CHLORIDE SCH MLS/HR: 900 INJECTION, SOLUTION INTRAVENOUS at 04:11

## 2018-03-31 RX ADMIN — NICOTINE SCH PATCH: 14 PATCH, EXTENDED RELEASE TOPICAL at 07:57

## 2018-03-31 RX ADMIN — LISINOPRIL SCH MG: 20 TABLET ORAL at 20:31

## 2018-03-31 RX ADMIN — IPRATROPIUM BROMIDE AND ALBUTEROL SULFATE SCH AMPULE: .5; 3 SOLUTION RESPIRATORY (INHALATION) at 08:32

## 2018-03-31 RX ADMIN — ALVIMOPAN SCH MG: 12 CAPSULE ORAL at 08:00

## 2018-03-31 RX ADMIN — ALVIMOPAN SCH MG: 12 CAPSULE ORAL at 20:30

## 2018-03-31 RX ADMIN — Medication SCH ML: at 20:32

## 2018-03-31 RX ADMIN — FUROSEMIDE SCH MG: 10 INJECTION, SOLUTION INTRAMUSCULAR; INTRAVENOUS at 17:20

## 2018-03-31 RX ADMIN — IPRATROPIUM BROMIDE AND ALBUTEROL SULFATE SCH AMPULE: .5; 3 SOLUTION RESPIRATORY (INHALATION) at 13:25

## 2018-03-31 RX ADMIN — ATORVASTATIN CALCIUM SCH MG: 80 TABLET, FILM COATED ORAL at 20:31

## 2018-03-31 RX ADMIN — Medication SCH ML: at 08:02

## 2018-03-31 RX ADMIN — IPRATROPIUM BROMIDE AND ALBUTEROL SULFATE SCH AMPULE: .5; 3 SOLUTION RESPIRATORY (INHALATION) at 16:00

## 2018-03-31 RX ADMIN — DEXTROSE MONOHYDRATE, SODIUM CHLORIDE, AND POTASSIUM CHLORIDE SCH MLS/HR: 50; 9; 1.49 INJECTION, SOLUTION INTRAVENOUS at 16:28

## 2018-03-31 RX ADMIN — IPRATROPIUM BROMIDE AND ALBUTEROL SULFATE SCH AMPULE: .5; 3 SOLUTION RESPIRATORY (INHALATION) at 21:01

## 2018-03-31 RX ADMIN — LISINOPRIL SCH MG: 20 TABLET ORAL at 07:58

## 2018-03-31 RX ADMIN — SODIUM CHLORIDE SCH MLS/HR: 900 INJECTION, SOLUTION INTRAVENOUS at 20:30

## 2018-03-31 RX ADMIN — DEXTROSE MONOHYDRATE, SODIUM CHLORIDE, AND POTASSIUM CHLORIDE SCH MLS/HR: 50; 9; 1.49 INJECTION, SOLUTION INTRAVENOUS at 07:57

## 2018-03-31 RX ADMIN — CETIRIZINE HYDROCHLORIDE SCH MG: 10 TABLET, FILM COATED ORAL at 08:03

## 2018-03-31 RX ADMIN — DEXTROSE MONOHYDRATE, SODIUM CHLORIDE, AND POTASSIUM CHLORIDE SCH MLS/HR: 50; 9; 1.49 INJECTION, SOLUTION INTRAVENOUS at 12:57

## 2018-03-31 RX ADMIN — MULTIPLE VITAMINS W/ MINERALS TAB SCH TAB: TAB at 08:03

## 2018-03-31 RX ADMIN — SODIUM CHLORIDE SCH MG: 900 INJECTION, SOLUTION INTRAVENOUS at 20:31

## 2018-03-31 RX ADMIN — SODIUM CHLORIDE SCH MLS/HR: 900 INJECTION, SOLUTION INTRAVENOUS at 10:52

## 2018-03-31 NOTE — HHI.PR
Subjective


Remarks


POD#2 s/p ascending colectomy





short of breath, painful





Objective





Vital Signs








  Date Time  Temp Pulse Resp B/P (MAP) Pulse Ox O2 Delivery O2 Flow Rate FiO2


 


3/31/18 08:33     96 Nasal Cannula 3.00 


 


3/31/18 08:00 98.0 87 16 132/70 (90) 94   


 


3/31/18 07:55   12     


 


3/31/18 05:07   18     


 


3/31/18 04:00 98.4 86 18 127/68 (87) 94   


 


3/31/18 00:00 97.8 93 16 146/72 (96) 98   


 


3/30/18 21:15   18     


 


3/30/18 20:17     96 Nasal Cannula 2.00 


 


3/30/18 20:00 98.4 84 16 123/73 (90) 98   


 


3/30/18 16:33 97.8 84 18 120/59 (79) 97   


 


3/30/18 15:11   14     


 


3/30/18 13:13 97.2 84 18 93/51 (65) 93   














I/O      


 


 3/30/18 3/30/18 3/30/18 3/31/18 3/31/18 3/31/18





 07:00 15:00 23:00 07:00 15:00 23:00


 


Intake Total 2024 ml  1100 ml 200 ml 200 ml 


 


Output Total 690 ml 25 ml 400 ml 1200 ml  


 


Balance 1334 ml -25 ml 700 ml -1000 ml 200 ml 


 


      


 


Intake Oral 100 ml  0 ml   


 


IV Total 924 ml  1100 ml 200 ml 200 ml 


 


Other 1000 ml     


 


Output Urine Total 490 ml  400 ml 1200 ml  


 


Gastric Drainage Total 150 ml 25 ml  0 ml  


 


Estimated Blood Loss 50 ml     


 


# Bowel Movements   0   








Result Diagram:  


3/31/18 0725                                                                   

             3/31/18 0725





Procedures


EGD


s/p exp laparotomy and right colectomy by Dr. Valles


Objective Remarks


Abdomen soft, nondistended, tender


Wound clean





Assessment and Plan


Assessment and Plan


Increase O2


OK to take Home med for breathing


Mobilize, encouraged incentive spirometry


Decrease IVF, Yoon Horner MD Mar 31, 2018 12:30

## 2018-03-31 NOTE — HHI.PR
Subjective


Remarks


This is a pleasant 68 y/o Female, history of Lung cancer, brought in to ER with 

Abdominal pain nausea and vomit, 


followed by GI specialist, as we know she has COPD, Hypertension, Right Upper 

lobe squamous cell carcinoma and


left upper lung adenocarcinoma, S/P left upper lobectomy and mediastinal lymph 

node dissection. Admits to occasion 


ETOH.  Smokes 10 cigarettes a day. Denies illicit drug use.  Also admits to 

taking a lot of Ibuprofen recently for back pain.  


3/30/18 s/p EGD and colonoscopy 3/29 found esophagitis, gastritis, colon polyps

, colon torsion vs volvulus.  CRS 


consulted and she is s/p ex lap and right colectomy.  Dr. Valles apparently the 

patient did have colonic volvulus and 


had infarcted her small bowel GI specialist signed off. Status post laparotomy 

and Right colectomy by Dr. Valles.





3/31: Seen in her bedroom in the presence of her  Mr. garcia, she is been 

coughing will continue Bronchodilator


Mucolytic and incentive spirometry, No nausea, vomit or diarrhea.





Objective





Vital Signs








  Date Time  Temp Pulse Resp B/P (MAP) Pulse Ox O2 Delivery O2 Flow Rate FiO2


 


3/31/18 08:33     96 Nasal Cannula 3.00 


 


3/31/18 07:55   12     


 


3/31/18 05:07   18     


 


3/31/18 04:00 98.4 86 18 127/68 (87) 94   


 


3/31/18 00:00 97.8 93 16 146/72 (96) 98   


 


3/30/18 21:15   18     


 


3/30/18 20:17     96 Nasal Cannula 2.00 


 


3/30/18 20:00 98.4 84 16 123/73 (90) 98   


 


3/30/18 16:33 97.8 84 18 120/59 (79) 97   


 


3/30/18 15:11   14     


 


3/30/18 13:13 97.2 84 18 93/51 (65) 93   


 


3/30/18 11:30  84 18 101/58 (72) 96 Nasal Cannula 2 


 


3/30/18 10:00  80 18 96/55 (69) 96 Nasal Cannula 2 














I/O      


 


 3/30/18 3/30/18 3/30/18 3/31/18 3/31/18 3/31/18





 07:00 15:00 23:00 07:00 15:00 23:00


 


Intake Total 2024 ml  1100 ml 200 ml  


 


Output Total 690 ml 25 ml 400 ml 1200 ml  


 


Balance 1334 ml -25 ml 700 ml -1000 ml  


 


      


 


Intake Oral 100 ml  0 ml   


 


IV Total 924 ml  1100 ml 200 ml  


 


Other 1000 ml     


 


Output Urine Total 490 ml  400 ml 1200 ml  


 


Gastric Drainage Total 150 ml 25 ml  0 ml  


 


Estimated Blood Loss 50 ml     


 


# Bowel Movements   0   








Result Diagram:  


3/31/18 0725                                                                   

             3/30/18 0727





Imaging





Last Impressions








Abdomen/Pelvis CT 3/28/18 0000 Signed





Impressions: 





 Service Date/Time:  Wednesday, March 28, 2018 01:04 - CONCLUSION:  1. Small 





 amount of pelvic free fluid. 2. Normal appendix. 3. Gaseous distention of the 





 ascending colon without obstruction.     Jared Peralta MD 


 


Abdomen X-Ray 3/27/18 [id number] Signed





Impressions: 





 Service Date/Time:  Wednesday, March 28, 2018 00:02 - CONCLUSION:  Mild 

gaseous 





 distention of bowel loops in right abdomen without obstruction. Round 





 benign-appearing calcification to the left of L4 vertebral body.     Jared Peralta MD 








Procedures


EGD


s/p exp laparotomy and right colectomy by Dr. Valles


Other Results





Laboratory Tests








Test


  3/28/18


00:20 3/28/18


00:24 3/30/18


07:27 3/31/18


07:25


 


Prothrombin Time 10.1 SEC    


 


Prothromb Time International


Ratio 1.0 RATIO 


  


  


  


 


 


Activated Partial


Thromboplast Time 22.6 SEC 


  


  


  


 


 


Lactic Acid Level 0.5 mmol/L    


 


Lipase 146 U/L    


 


Urine Color  YELLOW   


 


Urine Turbidity  CLEAR   


 


Urine pH  6.5   


 


Urine Specific Gravity  1.010   


 


Urine Protein  TRACE mg/dL   


 


Urine Glucose (UA)  NEG mg/dL   


 


Urine Ketones  10 mg/dL   


 


Urine Occult Blood  SMALL   


 


Urine Nitrite  NEG   


 


Urine Bilirubin  NEG   


 


Urine Urobilinogen


  


  LESS THAN 2.0


MG/DL 


  


 


 


Urine Leukocyte Esterase  TRACE   


 


Urine RBC  26 /hpf   


 


Urine WBC  3 /hpf   


 


Urine Squamous Epithelial


Cells 


  3 /hpf 


  


  


 


 


Urine Amorphous Sediment  RARE   


 


Urine Mucus  FEW /lpf   


 


Microscopic Urinalysis Comment


  


  CULT NOT


INDICATED 


  


 


 


Differential Total Cells


Counted 


  


  100 


  


 


 


Neutrophils % (Manual)   87 %  


 


Band Neutrophils %   10 %  


 


Lymphocytes %   2 %  


 


Monocytes %   1 %  


 


Neutrophils # (Manual)   15.5 TH/MM3  


 


Platelet Estimate   NORMAL  


 


Platelet Morphology Comment   NORMAL  


 


Ovalocytes   1+  


 


Acanthocytes   OCC  


 


Estimat Glomerular Filtration


Rate 


  


  88 ML/MIN 


  


 


 


Protein Corrected Calcium   8.2 MG/DL  


 


Blood Urea Nitrogen   16 MG/DL  


 


Creatinine   0.67 MG/DL  


 


Random Glucose   226 MG/DL  


 


Total Protein   4.7 GM/DL  


 


Albumin   2.1 GM/DL  


 


Calcium Level   6.9 MG/DL  


 


Phosphorus Level   1.7 MG/DL  


 


Magnesium Level   2.9 MG/DL  


 


Alkaline Phosphatase   43 U/L  


 


Aspartate Amino Transf


(AST/SGOT) 


  


  25 U/L 


  


 


 


Alanine Aminotransferase


(ALT/SGPT) 


  


  18 U/L 


  


 


 


Total Bilirubin   0.2 MG/DL  


 


Sodium Level   145 MEQ/L  


 


Potassium Level   3.4 MEQ/L  


 


Chloride Level   110 MEQ/L  


 


Carbon Dioxide Level   31.1 MEQ/L  


 


White Blood Count    16.7 TH/MM3 


 


Red Blood Count    3.45 MIL/MM3 


 


Hemoglobin    10.3 GM/DL 


 


Hematocrit    30.6 % 


 


Mean Corpuscular Volume    88.7 FL 


 


Mean Corpuscular Hemoglobin    29.9 PG 


 


Mean Corpuscular Hemoglobin


Concent 


  


  


  33.7 % 


 


 


Red Cell Distribution Width    14.5 % 


 


Platelet Count    228 TH/MM3 


 


Mean Platelet Volume    7.6 FL 


 


Neutrophils (%) (Auto)    83.4 % 


 


Lymphocytes (%) (Auto)    10.6 % 


 


Monocytes (%) (Auto)    5.6 % 


 


Eosinophils (%) (Auto)    0.0 % 


 


Basophils (%) (Auto)    0.4 % 


 


Neutrophils # (Auto)    13.9 TH/MM3 


 


Lymphocytes # (Auto)    1.8 TH/MM3 


 


Monocytes # (Auto)    0.9 TH/MM3 


 


Eosinophils # (Auto)    0.0 TH/MM3 


 


Basophils # (Auto)    0.1 TH/MM3 


 


CBC Comment    DIFF FINAL 


 


Differential Comment     








Objective Remarks


GENERAL: No acute distress.


SKIN: Warm and dry.


HEAD: Atraumatic. Normocephalic. 


EYES: Pupils equal and round. No scleral icterus. No injection or drainage. 


ENT: No nasal bleeding or discharge.  Mucous membranes pink and moist.  NG tube 

right nare.


NECK: Trachea midline. 


CARDIOVASCULAR: Regular rate and rhythm.  


RESPIRATORY: No accessory muscle use. Clear to auscultation. Breath sounds 

equal bilaterally. 


GASTROINTESTINAL: Abdomen postop binder in place, hypoactive BS.


MUSCULOSKELETAL: Extremities without clubbing, cyanosis, or edema. No obvious 

deformities. 


NEUROLOGICAL: Awake and alert. No obvious cranial nerve deficits.  Motor 

grossly within normal limits. Normal speech.


PSYCHIATRIC: Appropriate mood and affect; insight and judgment normal.


Medications and IVs





Current Medications








 Medications


  (Trade)  Dose


 Ordered  Sig/Trung


 Route  Start Time


 Stop Time Status Last Admin


 


  (Dilaudid Pf Inj)  1 mg  Q4H  PRN


 IV  3/28/18 02:45


    3/29/18 03:02


 


 


  (Albuterol Neb)  2.5 mg  TID NEB  PRN


 NEB  3/28/18 10:15


    3/30/18 12:40


 


 


  (Norvasc)  5 mg  DAILY


 PO  3/28/18 11:00


    3/31/18 08:02


 


 


  (Lipitor)  80 mg  HS


 PO  3/28/18 21:00


    3/28/18 22:01


 


 


  (ZyrTEC)  10 mg  DAILY


 PO  3/28/18 12:00


    3/31/18 08:03


 


 


  (Prinivil)  40 mg  BID


 PO  3/28/18 11:30


    3/31/18 07:58


 


 


  (Theragran M Tab)  1 tab  DAILY


 PO  3/28/18 12:00


    3/31/18 08:03


 


 


  (NS Flush)  2 ml  UNSCH  PRN


 IV FLUSH  3/28/18 10:15


     


 


 


  (NS Flush)  2 ml  BID


 IV FLUSH  3/28/18 21:00


    3/31/18 08:02


 


 


  (Tylenol)  650 mg  Q4H  PRN


 PO  3/28/18 10:15


     


 


 


  (Zofran Inj)  4 mg  Q6H  PRN


 IVP  3/28/18 10:15


    3/29/18 08:58


 


 


  (Reglan Inj)  5 mg  Q6H  PRN


 IV PUSH  3/28/18 10:15


     


 


 


  (Ambien)  5 mg  HS  PRN


 PO  3/28/18 10:15


     


 


 


  (Percocet  5-325


 Mg)  1 tab  Q6H  PRN


 PO  3/28/18 10:15


    3/29/18 09:08


 


 


  (Percocet 


 Mg)  1 tab  Q6H  PRN


 PO  3/28/18 10:15


    3/29/18 18:04


 


 


  (Habitrol 14 Mg


 Patch.24 Hr)  1 patch  DAILY


 T-DERMAL  3/29/18 09:00


     


 


 


 Miscellaneous


 Information  1  DAILY


 T-DERMAL  3/29/18 09:00


     


 


 


  (Betadine 5%


 Antisepsis Kit)  1 applic  ON CALL  PRN


 EACH NARE  3/29/18 20:45


 4/1/18 20:44   


 


 


  (Chlorhexidine


 2% Cloth)  3 pack  ON CALL  PRN


 TOPICAL  3/29/18 20:45


 4/1/18 20:44   


 


 


 Potassium


 Chloride/Dextrose/


 Sod Cl  1,000 ml @ 


 100 mls/hr  Q10H


 IV  3/29/18 23:35


    3/31/18 07:57


 


 


  (Toradol Inj)  30 mg  Q6H  PRN


 IVP  3/29/18 23:45


 4/1/18 23:44   


 


 


  (Entereg)  12 mg  BID


 PO  3/29/18 23:45


 4/5/18 09:01  3/31/18 08:00


 


 


  (Reglan Inj)  10 mg  Q12HR


 IVS  3/30/18 09:00


    3/31/18 08:02


 


 


  (Vasotec Inj)  1.25 mg  Q4H  PRN


 IV PUSH  3/29/18 23:45


     


 


 


  (Vasotec Inj)  2.5 mg  Q6H  PRN


 IV PUSH  3/29/18 23:45


     


 


 


  (Chloraseptic


 Dewey)  1 lozenge  UNSCH  PRN


 BUCCAL  3/29/18 23:45


     


 


 


 Potassium Chloride  100 ml @ 


 50 mls/hr  UNSCH  PRN


 IV  3/29/18 23:45


    3/30/18 10:40


 


 


 Potassium Chloride  100 ml @ 


 25 mls/hr  UNSCH  PRN


 IV  3/29/18 23:45


     


 


 


  (Narcan Inj)  0.4 mg  UNSCH  PRN


 IV PUSH  3/29/18 23:45


     


 


 


  (Morphine 1 Mg/


 ml PCA)  30 mg  UNSCH


 IV  3/29/18 23:45


    3/31/18 07:55


 


 


 PCA Dosage


 Infused (Pha)  1  Q8HR


 .XX  3/29/18 23:45


    3/31/18 05:07


 


 


 Cefazolin Sodium


 1000 mg/Sodium


 Chloride  100 ml @ 


 200 mls/hr  Q8H


 IV  3/30/18 14:00


 4/2/18 13:59  3/31/18 05:06


 


 


 Metronidazole  100 ml @ 


 100 mls/hr  Q8H


 IV  3/30/18 20:00


 4/2/18 19:59  3/31/18 04:11


 


 


  (Duoneb Neb)  1 ampule  Q6HR WHILE AWAKE  NEB


 NEB  3/30/18 20:00


    3/31/18 08:32


 











A/P


Assessment and Plan


//Cecal volvulus


   -s/p exploratory laparotomy and right colectomy performed by Dr. Valles


   -pain management per surgery


   -PT eval/tx


   -continue NGT per surgery team


   -NPO.  Continue on IVF.


   -follow up on path results





//Leukocytosis


   -patient is afebrile


   -continue on IV abx per surgery team - Ancef and Flagyl IV


   -monitor white count





//Anemia secondary to anemia of acute blood loss/postop


   -no active bleeding


   -continue to monitor CBC





//Hx of lung cancer s/p resection


   -resume home bronchodilator therapy - patients  to bring in from home


   -scheduled Duonebs


   -IS at bedside, encourage hourly use.  Acapella


   -monitor respiratory status


   -supplemental oxygen to maintain O2 sats >92%





//Hypokalemia


   -today 3.1 giving replacement for Potassium and Phosphorus. 





//Hyperglycemia


   -obtain A1c


   -accuchek and ISS





//DVT prophylaxis


   -Bilateral scd/delilah hose


   -avoidance of chemical prophylaxis periprocedural


Discharge Planning


Once cleared by General Surgery.











Emiliano Koo MD Mar 31, 2018 08:46

## 2018-04-01 VITALS
DIASTOLIC BLOOD PRESSURE: 60 MMHG | OXYGEN SATURATION: 94 % | HEART RATE: 107 BPM | SYSTOLIC BLOOD PRESSURE: 116 MMHG | RESPIRATION RATE: 18 BRPM | TEMPERATURE: 99 F

## 2018-04-01 VITALS
OXYGEN SATURATION: 94 % | HEART RATE: 106 BPM | DIASTOLIC BLOOD PRESSURE: 76 MMHG | SYSTOLIC BLOOD PRESSURE: 120 MMHG | TEMPERATURE: 98.7 F | RESPIRATION RATE: 16 BRPM

## 2018-04-01 VITALS — OXYGEN SATURATION: 94 %

## 2018-04-01 VITALS
SYSTOLIC BLOOD PRESSURE: 135 MMHG | HEART RATE: 97 BPM | OXYGEN SATURATION: 94 % | TEMPERATURE: 98 F | DIASTOLIC BLOOD PRESSURE: 68 MMHG | RESPIRATION RATE: 17 BRPM

## 2018-04-01 VITALS
HEART RATE: 92 BPM | TEMPERATURE: 98.4 F | RESPIRATION RATE: 20 BRPM | OXYGEN SATURATION: 92 % | DIASTOLIC BLOOD PRESSURE: 59 MMHG | SYSTOLIC BLOOD PRESSURE: 121 MMHG

## 2018-04-01 VITALS
OXYGEN SATURATION: 94 % | DIASTOLIC BLOOD PRESSURE: 63 MMHG | TEMPERATURE: 98 F | SYSTOLIC BLOOD PRESSURE: 122 MMHG | HEART RATE: 98 BPM | RESPIRATION RATE: 20 BRPM

## 2018-04-01 VITALS — OXYGEN SATURATION: 92 %

## 2018-04-01 VITALS
OXYGEN SATURATION: 92 % | SYSTOLIC BLOOD PRESSURE: 140 MMHG | DIASTOLIC BLOOD PRESSURE: 68 MMHG | RESPIRATION RATE: 19 BRPM | HEART RATE: 101 BPM | TEMPERATURE: 98.3 F

## 2018-04-01 LAB
BUN SERPL-MCNC: 6 MG/DL (ref 7–18)
CALCIUM SERPL-MCNC: 7.7 MG/DL (ref 8.5–10.1)
CHLORIDE SERPL-SCNC: 107 MEQ/L (ref 98–107)
CREAT SERPL-MCNC: 0.59 MG/DL (ref 0.5–1)
GFR SERPLBLD BASED ON 1.73 SQ M-ARVRAT: 102 ML/MIN (ref 89–?)
GLUCOSE SERPL-MCNC: 150 MG/DL (ref 74–106)
HCO3 BLD-SCNC: 27.9 MEQ/L (ref 21–32)
MAGNESIUM SERPL-MCNC: 2 MG/DL (ref 1.5–2.5)
PHOSPHATE SERPL-MCNC: 0.9 MG/DL (ref 2.5–4.9)
SODIUM SERPL-SCNC: 141 MEQ/L (ref 136–145)

## 2018-04-01 RX ADMIN — FUROSEMIDE SCH MG: 10 INJECTION, SOLUTION INTRAMUSCULAR; INTRAVENOUS at 16:04

## 2018-04-01 RX ADMIN — Medication SCH ML: at 20:40

## 2018-04-01 RX ADMIN — POTASSIUM CHLORIDE ONE MEQ: 20 TABLET, EXTENDED RELEASE ORAL at 09:47

## 2018-04-01 RX ADMIN — IPRATROPIUM BROMIDE AND ALBUTEROL SULFATE SCH AMPULE: .5; 3 SOLUTION RESPIRATORY (INHALATION) at 15:48

## 2018-04-01 RX ADMIN — Medication SCH ML: at 08:05

## 2018-04-01 RX ADMIN — IPRATROPIUM BROMIDE AND ALBUTEROL SULFATE SCH AMPULE: .5; 3 SOLUTION RESPIRATORY (INHALATION) at 19:25

## 2018-04-01 RX ADMIN — LISINOPRIL SCH MG: 20 TABLET ORAL at 20:42

## 2018-04-01 RX ADMIN — ALVIMOPAN SCH MG: 12 CAPSULE ORAL at 08:04

## 2018-04-01 RX ADMIN — IPRATROPIUM BROMIDE AND ALBUTEROL SULFATE SCH AMPULE: .5; 3 SOLUTION RESPIRATORY (INHALATION) at 23:19

## 2018-04-01 RX ADMIN — IPRATROPIUM BROMIDE AND ALBUTEROL SULFATE SCH AMPULE: .5; 3 SOLUTION RESPIRATORY (INHALATION) at 04:02

## 2018-04-01 RX ADMIN — SODIUM CHLORIDE SCH MLS/HR: 900 INJECTION, SOLUTION INTRAVENOUS at 20:42

## 2018-04-01 RX ADMIN — NICOTINE SCH PATCH: 14 PATCH, EXTENDED RELEASE TOPICAL at 08:04

## 2018-04-01 RX ADMIN — ATORVASTATIN CALCIUM SCH MG: 80 TABLET, FILM COATED ORAL at 20:42

## 2018-04-01 RX ADMIN — LISINOPRIL SCH MG: 20 TABLET ORAL at 08:04

## 2018-04-01 RX ADMIN — ALVIMOPAN SCH MG: 12 CAPSULE ORAL at 20:42

## 2018-04-01 RX ADMIN — SODIUM CHLORIDE SCH MLS/HR: 900 INJECTION, SOLUTION INTRAVENOUS at 10:21

## 2018-04-01 RX ADMIN — SODIUM CHLORIDE SCH MG: 900 INJECTION, SOLUTION INTRAVENOUS at 20:41

## 2018-04-01 RX ADMIN — IPRATROPIUM BROMIDE AND ALBUTEROL SULFATE SCH AMPULE: .5; 3 SOLUTION RESPIRATORY (INHALATION) at 12:57

## 2018-04-01 RX ADMIN — IPRATROPIUM BROMIDE AND ALBUTEROL SULFATE SCH AMPULE: .5; 3 SOLUTION RESPIRATORY (INHALATION) at 08:17

## 2018-04-01 RX ADMIN — FUROSEMIDE SCH MG: 10 INJECTION, SOLUTION INTRAMUSCULAR; INTRAVENOUS at 08:05

## 2018-04-01 RX ADMIN — MULTIPLE VITAMINS W/ MINERALS TAB SCH TAB: TAB at 08:04

## 2018-04-01 RX ADMIN — SODIUM CHLORIDE SCH MG: 900 INJECTION, SOLUTION INTRAVENOUS at 08:05

## 2018-04-01 RX ADMIN — CETIRIZINE HYDROCHLORIDE SCH MG: 10 TABLET, FILM COATED ORAL at 08:03

## 2018-04-01 RX ADMIN — GUAIFENESIN SCH MG: 600 TABLET, EXTENDED RELEASE ORAL at 20:42

## 2018-04-01 RX ADMIN — POTASSIUM CHLORIDE ONE MEQ: 20 TABLET, EXTENDED RELEASE ORAL at 08:30

## 2018-04-01 RX ADMIN — MORPHINE SULFATE SCH MG: 1 INJECTION, SOLUTION INTRAVENOUS at 04:55

## 2018-04-01 RX ADMIN — SODIUM CHLORIDE SCH MLS/HR: 900 INJECTION, SOLUTION INTRAVENOUS at 04:56

## 2018-04-01 RX ADMIN — IPRATROPIUM BROMIDE AND ALBUTEROL SULFATE SCH AMPULE: .5; 3 SOLUTION RESPIRATORY (INHALATION) at 00:00

## 2018-04-01 NOTE — HHI.PR
Subjective


Remarks


POD#2 s/p ascending colectomy





much better, denies SOB





Objective





Vital Signs








  Date Time  Temp Pulse Resp B/P (MAP) Pulse Ox O2 Delivery O2 Flow Rate FiO2


 


4/1/18 11:42 98.0 97 17 135/68 (90) 94   


 


4/1/18 08:14      Nasal Cannula 4.00 


 


4/1/18 08:00 98.3 101 19 140/68 (92) 92   


 


4/1/18 04:57   20     


 


4/1/18 04:55   20     


 


4/1/18 04:08     92 Nasal Cannula 6.00 


 


4/1/18 04:00 98.0 98 20 122/63 (82) 94   


 


4/1/18 00:00 98.4 92 20 121/59 (79) 92   


 


3/31/18 21:04     93 Nasal Cannula 4.00 


 


3/31/18 20:33   20     


 


3/31/18 20:30       4.00 


 


3/31/18 20:00 98.6 97 22 141/70 (93) 91   


 


3/31/18 16:00 98.1 94 17 140/67 (91) 92   


 


3/31/18 14:37     92   


 


3/31/18 14:37     92 Nasal Cannula 4.00 


 


3/31/18 12:57   17     














I/O      


 


 3/31/18 3/31/18 3/31/18 4/1/18 4/1/18 4/1/18





 07:00 15:00 23:00 07:00 15:00 23:00


 


Intake Total 200 ml 300 ml 670 ml 360 ml  


 


Output Total 1200 ml 175 ml 900 ml 650 ml  


 


Balance -1000 ml 125 ml -230 ml -290 ml  


 


      


 


Intake Oral   670 ml 360 ml  


 


IV Total 200 ml 300 ml    


 


Output Urine Total 1200 ml 175 ml 900 ml 650 ml  


 


Gastric Drainage Total 0 ml     


 


# Bowel Movements   0 1  








Result Diagram:  


3/31/18 0725                                                                   

             4/1/18 0400





Procedures


EGD


s/p exp laparotomy and right colectomy by Dr. Valles


Objective Remarks


Abdomen soft, nondistended, tender


Wound clean





Assessment and Plan


Assessment and Plan


Tolerating clears - advance to full liquid


Wean O2 as tolerated


Encouraged mobilization, pulmonary toilet











Yoon Qureshi MD Apr 1, 2018 12:46

## 2018-04-01 NOTE — HHI.PR
Subjective


Remarks


This is a pleasant 68 y/o Female, history of Lung cancer, brought in to ER with 

Abdominal pain nausea and vomit, 


followed by GI specialist, as we know she has COPD, Hypertension, Right Upper 

lobe squamous cell carcinoma and


left upper lung adenocarcinoma, S/P left upper lobectomy and mediastinal lymph 

node dissection. Admits to occasion 


ETOH.  Smokes 10 cigarettes a day. Denies illicit drug use.  Also admits to 

taking a lot of Ibuprofen recently for back pain.  


3/30/18 s/p EGD and colonoscopy 3/29 found esophagitis, gastritis, colon polyps

, colon torsion vs volvulus.  CRS 


consulted and she is s/p ex lap and right colectomy.  Dr. Valles apparently the 

patient did have colonic volvulus and 


had infarcted her small bowel GI specialist signed off. Status post laparotomy 

and Right colectomy by Dr. Valles.





3/31: Seen in her bedroom in the presence of her  Mr. Koenig, she is been 

coughing will continue Bronchodilator


Mucolytic and incentive spirometry.





4/01: Stable in her bedroom again with her  Mr. Koenig, no nausea, vomit 

or diarrhea continue replacing electrolytes





Objective





Vital Signs








  Date Time  Temp Pulse Resp B/P (MAP) Pulse Ox O2 Delivery O2 Flow Rate FiO2


 


4/1/18 15:48     94 Nasal Cannula 4.00 


 


4/1/18 11:42 98.0 97 17 135/68 (90) 94   


 


4/1/18 08:14      Nasal Cannula 4.00 


 


4/1/18 08:00 98.3 101 19 140/68 (92) 92   


 


4/1/18 04:57   20     


 


4/1/18 04:55   20     


 


4/1/18 04:08     92 Nasal Cannula 6.00 


 


4/1/18 04:00 98.0 98 20 122/63 (82) 94   


 


4/1/18 00:00 98.4 92 20 121/59 (79) 92   


 


3/31/18 21:04     93 Nasal Cannula 4.00 


 


3/31/18 20:33   20     


 


3/31/18 20:30       4.00 


 


3/31/18 20:00 98.6 97 22 141/70 (93) 91   














I/O      


 


 3/31/18 3/31/18 3/31/18 4/1/18 4/1/18 4/1/18





 07:00 15:00 23:00 07:00 15:00 23:00


 


Intake Total 200 ml 300 ml 670 ml 360 ml  


 


Output Total 1200 ml 175 ml 900 ml 650 ml  


 


Balance -1000 ml 125 ml -230 ml -290 ml  


 


      


 


Intake Oral   670 ml 360 ml  


 


IV Total 200 ml 300 ml    


 


Output Urine Total 1200 ml 175 ml 900 ml 650 ml  


 


Gastric Drainage Total 0 ml     


 


# Bowel Movements   0 1  








Result Diagram:  


3/31/18 0725                                                                   

             4/1/18 0400





Imaging





Last Impressions








Abdomen/Pelvis CT 3/28/18 0000 Signed





Impressions: 





 Service Date/Time:  Wednesday, March 28, 2018 01:04 - CONCLUSION:  1. Small 





 amount of pelvic free fluid. 2. Normal appendix. 3. Gaseous distention of the 





 ascending colon without obstruction.     Jared Peralta MD 


 


Abdomen X-Ray 3/27/18 [id number] Signed





Impressions: 





 Service Date/Time:  Wednesday, March 28, 2018 00:02 - CONCLUSION:  Mild 

gaseous 





 distention of bowel loops in right abdomen without obstruction. Round 





 benign-appearing calcification to the left of L4 vertebral body.     Jared Peralta MD 








Procedures


EGD


s/p exp laparotomy and right colectomy by Dr. Valles


Other Results





Laboratory Tests








Test


  3/28/18


00:20 3/28/18


00:24 3/30/18


07:27 3/31/18


07:25


 


Prothrombin Time 10.1 SEC    


 


Prothromb Time International


Ratio 1.0 RATIO 


  


  


  


 


 


Activated Partial


Thromboplast Time 22.6 SEC 


  


  


  


 


 


Lactic Acid Level 0.5 mmol/L    


 


Lipase 146 U/L    


 


Urine Color  YELLOW   


 


Urine Turbidity  CLEAR   


 


Urine pH  6.5   


 


Urine Specific Gravity  1.010   


 


Urine Protein  TRACE mg/dL   


 


Urine Glucose (UA)  NEG mg/dL   


 


Urine Ketones  10 mg/dL   


 


Urine Occult Blood  SMALL   


 


Urine Nitrite  NEG   


 


Urine Bilirubin  NEG   


 


Urine Urobilinogen


  


  LESS THAN 2.0


MG/DL 


  


 


 


Urine Leukocyte Esterase  TRACE   


 


Urine RBC  26 /hpf   


 


Urine WBC  3 /hpf   


 


Urine Squamous Epithelial


Cells 


  3 /hpf 


  


  


 


 


Urine Amorphous Sediment  RARE   


 


Urine Mucus  FEW /lpf   


 


Microscopic Urinalysis Comment


  


  CULT NOT


INDICATED 


  


 


 


Differential Total Cells


Counted 


  


  100 


  


 


 


Neutrophils % (Manual)   87 %  


 


Band Neutrophils %   10 %  


 


Lymphocytes %   2 %  


 


Monocytes %   1 %  


 


Neutrophils # (Manual)   15.5 TH/MM3  


 


Platelet Estimate   NORMAL  


 


Platelet Morphology Comment   NORMAL  


 


Ovalocytes   1+  


 


Acanthocytes   OCC  


 


Blood Urea Nitrogen   16 MG/DL  7 MG/DL 


 


Creatinine   0.67 MG/DL  0.49 MG/DL 


 


Random Glucose   226 MG/DL  127 MG/DL 


 


Total Protein   4.7 GM/DL  5.2 GM/DL 


 


Albumin   2.1 GM/DL  


 


Calcium Level   6.9 MG/DL  7.1 MG/DL 


 


Phosphorus Level   1.7 MG/DL  


 


Magnesium Level   2.9 MG/DL  


 


Alkaline Phosphatase   43 U/L  


 


Aspartate Amino Transf


(AST/SGOT) 


  


  25 U/L 


  


 


 


Alanine Aminotransferase


(ALT/SGPT) 


  


  18 U/L 


  


 


 


Total Bilirubin   0.2 MG/DL  


 


Sodium Level   145 MEQ/L  148 MEQ/L 


 


Potassium Level   3.4 MEQ/L  3.1 MEQ/L 


 


Chloride Level   110 MEQ/L  112 MEQ/L 


 


Carbon Dioxide Level   31.1 MEQ/L  30.6 MEQ/L 


 


White Blood Count    16.7 TH/MM3 


 


Red Blood Count    3.45 MIL/MM3 


 


Hemoglobin    10.3 GM/DL 


 


Hematocrit    30.6 % 


 


Mean Corpuscular Volume    88.7 FL 


 


Mean Corpuscular Hemoglobin    29.9 PG 


 


Mean Corpuscular Hemoglobin


Concent 


  


  


  33.7 % 


 


 


Red Cell Distribution Width    14.5 % 


 


Platelet Count    228 TH/MM3 


 


Mean Platelet Volume    7.6 FL 


 


Neutrophils (%) (Auto)    83.4 % 


 


Lymphocytes (%) (Auto)    10.6 % 


 


Monocytes (%) (Auto)    5.6 % 


 


Eosinophils (%) (Auto)    0.0 % 


 


Basophils (%) (Auto)    0.4 % 


 


Neutrophils # (Auto)    13.9 TH/MM3 


 


Lymphocytes # (Auto)    1.8 TH/MM3 


 


Monocytes # (Auto)    0.9 TH/MM3 


 


Eosinophils # (Auto)    0.0 TH/MM3 


 


Basophils # (Auto)    0.1 TH/MM3 


 


CBC Comment    DIFF FINAL 


 


Differential Comment     


 


Protein Corrected Calcium    8.1 MG/DL 


 


Test


  4/1/18


04:00 4/1/18


15:09 


  


 


 


Blood Urea Nitrogen 6 MG/DL    


 


Creatinine 0.59 MG/DL    


 


Random Glucose 150 MG/DL    


 


Calcium Level 7.7 MG/DL    


 


Phosphorus Level 0.9 MG/DL  0.9 MG/DL   


 


Magnesium Level 2.0 MG/DL    


 


Sodium Level 141 MEQ/L    


 


Potassium Level 3.5 MEQ/L    


 


Chloride Level 107 MEQ/L    


 


Carbon Dioxide Level 27.9 MEQ/L    


 


Anion Gap 6 MEQ/L    


 


Estimat Glomerular Filtration


Rate 102 ML/MIN 


  


  


  


 








Objective Remarks


GENERAL: No acute distress.


SKIN: Warm and dry.


HEAD: Atraumatic. Normocephalic. 


EYES: Pupils equal and round. No scleral icterus. No injection or drainage. 


ENT: No nasal bleeding or discharge.  Mucous membranes pink and moist.  NG tube 

right nare.


NECK: Trachea midline. 


CARDIOVASCULAR: Regular rate and rhythm.  


RESPIRATORY: No accessory muscle use. Clear to auscultation. Breath sounds 

equal bilaterally. 


GASTROINTESTINAL: Abdomen postop binder in place, hypoactive BS.


MUSCULOSKELETAL: Extremities without clubbing, cyanosis, or edema. No obvious 

deformities. 


NEUROLOGICAL: Awake and alert. No obvious cranial nerve deficits.  Motor 

grossly within normal limits. Normal speech.


PSYCHIATRIC: Appropriate mood and affect; insight and judgment normal.


Medications and IVs





Current Medications








 Medications


  (Trade)  Dose


 Ordered  Sig/Trung


 Route  Start Time


 Stop Time Status Last Admin


 


  (Dilaudid Pf Inj)  1 mg  Q4H  PRN


 IV  3/28/18 02:45


    3/29/18 03:02


 


 


  (Norvasc)  5 mg  DAILY


 PO  3/28/18 11:00


    4/1/18 08:04


 


 


  (Lipitor)  80 mg  HS


 PO  3/28/18 21:00


    3/31/18 20:31


 


 


  (ZyrTEC)  10 mg  DAILY


 PO  3/28/18 12:00


    4/1/18 08:03


 


 


  (Prinivil)  40 mg  BID


 PO  3/28/18 11:30


    4/1/18 08:04


 


 


  (Theragran M Tab)  1 tab  DAILY


 PO  3/28/18 12:00


    4/1/18 08:04


 


 


  (NS Flush)  2 ml  UNSCH  PRN


 IV FLUSH  3/28/18 10:15


     


 


 


  (NS Flush)  2 ml  BID


 IV FLUSH  3/28/18 21:00


    3/31/18 20:32


 


 


  (Tylenol)  650 mg  Q4H  PRN


 PO  3/28/18 10:15


     


 


 


  (Zofran Inj)  4 mg  Q6H  PRN


 IVP  3/28/18 10:15


    3/29/18 08:58


 


 


  (Reglan Inj)  5 mg  Q6H  PRN


 IV PUSH  3/28/18 10:15


     


 


 


  (Ambien)  5 mg  HS  PRN


 PO  3/28/18 10:15


     


 


 


  (Percocet  5-325


 Mg)  1 tab  Q6H  PRN


 PO  3/28/18 10:15


    3/29/18 09:08


 


 


  (Percocet 


 Mg)  1 tab  Q6H  PRN


 PO  3/28/18 10:15


    3/29/18 18:04


 


 


  (Habitrol 14 Mg


 Patch.24 Hr)  1 patch  DAILY


 T-DERMAL  3/29/18 09:00


     


 


 


 Miscellaneous


 Information  1  DAILY


 T-DERMAL  3/29/18 09:00


     


 


 


  (Betadine 5%


 Antisepsis Kit)  1 applic  ON CALL  PRN


 EACH NARE  3/29/18 20:45


 4/1/18 20:44   


 


 


  (Chlorhexidine


 2% Cloth)  3 pack  ON CALL  PRN


 TOPICAL  3/29/18 20:45


 4/1/18 20:44   


 


 


  (Toradol Inj)  30 mg  Q6H  PRN


 IVP  3/29/18 23:45


 4/1/18 23:44   


 


 


  (Entereg)  12 mg  BID


 PO  3/29/18 23:45


 4/5/18 09:01  4/1/18 08:04


 


 


  (Reglan Inj)  10 mg  Q12HR


 IVS  3/30/18 09:00


    4/1/18 08:05


 


 


  (Vasotec Inj)  1.25 mg  Q4H  PRN


 IV PUSH  3/29/18 23:45


     


 


 


  (Vasotec Inj)  2.5 mg  Q6H  PRN


 IV PUSH  3/29/18 23:45


     


 


 


  (Chloraseptic


 Dewey)  1 lozenge  UNSCH  PRN


 BUCCAL  3/29/18 23:45


     


 


 


  (Narcan Inj)  0.4 mg  UNSCH  PRN


 IV PUSH  3/29/18 23:45


     


 


 


  (Morphine 1 Mg/


 ml PCA)  30 mg  UNSCH


 IV  3/29/18 23:45


    4/1/18 04:55


 


 


 PCA Dosage


 Infused (Pha)  1  Q8HR


 .XX  3/29/18 23:45


    4/1/18 13:45


 


 


 Cefazolin Sodium


 1000 mg/Sodium


 Chloride  100 ml @ 


 200 mls/hr  Q8H


 IV  3/30/18 14:00


 4/2/18 13:59  4/1/18 13:44


 


 


 Metronidazole  100 ml @ 


 100 mls/hr  Q8H


 IV  3/30/18 20:00


 4/2/18 19:59  4/1/18 10:21


 


 


  (Duoneb Neb)  1 ampule  Q4HR  NEB


 NEB  3/31/18 12:00


    4/1/18 15:48


 


 


  (Albuterol Neb)  2.5 mg  Q6HR NEB  PRN


 NEB  3/31/18 12:45


     


 


 


  (Lasix Inj)  20 mg  BID@09,18


 IV PUSH  3/31/18 18:00


    4/1/18 16:04


 











A/P


Assessment and Plan


//Cecal volvulus


   -s/p exploratory laparotomy and right colectomy performed by Dr. Valles


   -pain management per surgery


   -PT eval/tx


   -continue NGT per surgery team


   -started on liquid diet by General Surgery. 


   -follow up on path results





//Leukocytosis


   -patient is afebrile


   -continue on IV abx per surgery team - Ancef and Flagyl IV


   -monitor white count





//Anemia secondary to anemia of acute blood loss/postop


   -no active bleeding hemoglobin 10.3





//Hx of lung cancer s/p resection


   -resume home bronchodilator therapy - patients  to bring in from home


   -scheduled Bronchodilator, Mucolytic and incentive spirometry. 


   -supplemental oxygen to maintain O2 sats >92%





//Hypokalemia


   -today 3.5 giving potassium by mouth, Phosphorus two times during the day 

Phosphorus 0.9 


             giving 30 mmol of Potassium phosphate two times today. 





//Hyperglycemia


   -obtain A1c


   -accuchek and ISS





//DVT prophylaxis


   -Bilateral scd/delilah hose


   -avoidance of chemical prophylaxis periprocedural


             Encourage ambulation.


Discharge Planning


Once cleared by General Surgery.











Emiliano Koo MD Apr 1, 2018 16:28

## 2018-04-02 VITALS
TEMPERATURE: 97.9 F | OXYGEN SATURATION: 97 % | DIASTOLIC BLOOD PRESSURE: 55 MMHG | HEART RATE: 98 BPM | RESPIRATION RATE: 16 BRPM | SYSTOLIC BLOOD PRESSURE: 137 MMHG

## 2018-04-02 VITALS
OXYGEN SATURATION: 98 % | DIASTOLIC BLOOD PRESSURE: 55 MMHG | HEART RATE: 108 BPM | SYSTOLIC BLOOD PRESSURE: 98 MMHG | RESPIRATION RATE: 16 BRPM | TEMPERATURE: 98.3 F

## 2018-04-02 VITALS
TEMPERATURE: 98.8 F | RESPIRATION RATE: 16 BRPM | OXYGEN SATURATION: 96 % | DIASTOLIC BLOOD PRESSURE: 61 MMHG | HEART RATE: 109 BPM | SYSTOLIC BLOOD PRESSURE: 114 MMHG

## 2018-04-02 VITALS
RESPIRATION RATE: 18 BRPM | TEMPERATURE: 98.4 F | OXYGEN SATURATION: 93 % | SYSTOLIC BLOOD PRESSURE: 117 MMHG | DIASTOLIC BLOOD PRESSURE: 58 MMHG | HEART RATE: 103 BPM

## 2018-04-02 VITALS
HEART RATE: 101 BPM | RESPIRATION RATE: 18 BRPM | SYSTOLIC BLOOD PRESSURE: 121 MMHG | DIASTOLIC BLOOD PRESSURE: 67 MMHG | TEMPERATURE: 98.6 F | OXYGEN SATURATION: 96 %

## 2018-04-02 VITALS
DIASTOLIC BLOOD PRESSURE: 57 MMHG | RESPIRATION RATE: 20 BRPM | SYSTOLIC BLOOD PRESSURE: 103 MMHG | HEART RATE: 103 BPM | OXYGEN SATURATION: 96 % | TEMPERATURE: 98.8 F

## 2018-04-02 VITALS — OXYGEN SATURATION: 94 %

## 2018-04-02 VITALS — OXYGEN SATURATION: 92 %

## 2018-04-02 LAB
BASOPHILS # BLD AUTO: 0 TH/MM3 (ref 0–0.2)
BASOPHILS NFR BLD: 0.4 % (ref 0–2)
BUN SERPL-MCNC: 7 MG/DL (ref 7–18)
CALCIUM SERPL-MCNC: 7.5 MG/DL (ref 8.5–10.1)
CHLORIDE SERPL-SCNC: 108 MEQ/L (ref 98–107)
CREAT SERPL-MCNC: 0.54 MG/DL (ref 0.5–1)
EOSINOPHIL # BLD: 0 TH/MM3 (ref 0–0.4)
EOSINOPHIL NFR BLD: 0.4 % (ref 0–4)
ERYTHROCYTE [DISTWIDTH] IN BLOOD BY AUTOMATED COUNT: 14.4 % (ref 11.6–17.2)
GFR SERPLBLD BASED ON 1.73 SQ M-ARVRAT: 113 ML/MIN (ref 89–?)
GLUCOSE SERPL-MCNC: 113 MG/DL (ref 74–106)
HCO3 BLD-SCNC: 26.3 MEQ/L (ref 21–32)
HCT VFR BLD CALC: 31.7 % (ref 35–46)
HGB BLD-MCNC: 10.8 GM/DL (ref 11.6–15.3)
LYMPHOCYTES # BLD AUTO: 1.4 TH/MM3 (ref 1–4.8)
LYMPHOCYTES NFR BLD AUTO: 12.8 % (ref 9–44)
MAGNESIUM SERPL-MCNC: 1.8 MG/DL (ref 1.5–2.5)
MCH RBC QN AUTO: 30 PG (ref 27–34)
MCHC RBC AUTO-ENTMCNC: 34 % (ref 32–36)
MCV RBC AUTO: 88.3 FL (ref 80–100)
MONOCYTE #: 0.5 TH/MM3 (ref 0–0.9)
MONOCYTES NFR BLD: 4.9 % (ref 0–8)
NEUTROPHILS # BLD AUTO: 8.7 TH/MM3 (ref 1.8–7.7)
NEUTROPHILS NFR BLD AUTO: 81.5 % (ref 16–70)
PHOSPHATE SERPL-MCNC: 2 MG/DL (ref 2.5–4.9)
PLATELET # BLD: 262 TH/MM3 (ref 150–450)
PMV BLD AUTO: 7.7 FL (ref 7–11)
RBC # BLD AUTO: 3.59 MIL/MM3 (ref 4–5.3)
SODIUM SERPL-SCNC: 142 MEQ/L (ref 136–145)
WBC # BLD AUTO: 10.7 TH/MM3 (ref 4–11)

## 2018-04-02 RX ADMIN — GUAIFENESIN SCH MG: 600 TABLET, EXTENDED RELEASE ORAL at 09:22

## 2018-04-02 RX ADMIN — LISINOPRIL SCH MG: 20 TABLET ORAL at 09:22

## 2018-04-02 RX ADMIN — FUROSEMIDE SCH MG: 10 INJECTION, SOLUTION INTRAMUSCULAR; INTRAVENOUS at 09:23

## 2018-04-02 RX ADMIN — SODIUM CHLORIDE SCH MLS/HR: 900 INJECTION, SOLUTION INTRAVENOUS at 13:22

## 2018-04-02 RX ADMIN — CETIRIZINE HYDROCHLORIDE SCH MG: 10 TABLET, FILM COATED ORAL at 09:21

## 2018-04-02 RX ADMIN — IPRATROPIUM BROMIDE AND ALBUTEROL SULFATE SCH AMPULE: .5; 3 SOLUTION RESPIRATORY (INHALATION) at 08:39

## 2018-04-02 RX ADMIN — LISINOPRIL SCH MG: 20 TABLET ORAL at 21:37

## 2018-04-02 RX ADMIN — MORPHINE SULFATE SCH MG: 1 INJECTION, SOLUTION INTRAVENOUS at 10:21

## 2018-04-02 RX ADMIN — IPRATROPIUM BROMIDE AND ALBUTEROL SULFATE SCH AMPULE: .5; 3 SOLUTION RESPIRATORY (INHALATION) at 11:44

## 2018-04-02 RX ADMIN — INSULIN ASPART SCH: 100 INJECTION, SOLUTION INTRAVENOUS; SUBCUTANEOUS at 21:42

## 2018-04-02 RX ADMIN — IPRATROPIUM BROMIDE AND ALBUTEROL SULFATE SCH AMPULE: .5; 3 SOLUTION RESPIRATORY (INHALATION) at 20:53

## 2018-04-02 RX ADMIN — IPRATROPIUM BROMIDE AND ALBUTEROL SULFATE SCH AMPULE: .5; 3 SOLUTION RESPIRATORY (INHALATION) at 04:03

## 2018-04-02 RX ADMIN — MULTIPLE VITAMINS W/ MINERALS TAB SCH TAB: TAB at 09:21

## 2018-04-02 RX ADMIN — SODIUM CHLORIDE SCH MLS/HR: 900 INJECTION, SOLUTION INTRAVENOUS at 15:14

## 2018-04-02 RX ADMIN — ALVIMOPAN SCH MG: 12 CAPSULE ORAL at 09:22

## 2018-04-02 RX ADMIN — Medication SCH ML: at 09:21

## 2018-04-02 RX ADMIN — INSULIN ASPART SCH: 100 INJECTION, SOLUTION INTRAVENOUS; SUBCUTANEOUS at 12:00

## 2018-04-02 RX ADMIN — NICOTINE SCH PATCH: 14 PATCH, EXTENDED RELEASE TOPICAL at 09:00

## 2018-04-02 RX ADMIN — IPRATROPIUM BROMIDE AND ALBUTEROL SULFATE SCH AMPULE: .5; 3 SOLUTION RESPIRATORY (INHALATION) at 23:47

## 2018-04-02 RX ADMIN — Medication SCH ML: at 21:42

## 2018-04-02 RX ADMIN — ATORVASTATIN CALCIUM SCH MG: 80 TABLET, FILM COATED ORAL at 21:36

## 2018-04-02 RX ADMIN — FUROSEMIDE SCH MG: 10 INJECTION, SOLUTION INTRAMUSCULAR; INTRAVENOUS at 18:13

## 2018-04-02 RX ADMIN — ENOXAPARIN SODIUM SCH MG: 40 INJECTION SUBCUTANEOUS at 13:22

## 2018-04-02 RX ADMIN — SODIUM CHLORIDE SCH MLS/HR: 900 INJECTION, SOLUTION INTRAVENOUS at 04:27

## 2018-04-02 RX ADMIN — IPRATROPIUM BROMIDE AND ALBUTEROL SULFATE SCH AMPULE: .5; 3 SOLUTION RESPIRATORY (INHALATION) at 16:02

## 2018-04-02 RX ADMIN — GUAIFENESIN SCH MG: 600 TABLET, EXTENDED RELEASE ORAL at 21:36

## 2018-04-02 RX ADMIN — SODIUM CHLORIDE SCH MG: 900 INJECTION, SOLUTION INTRAVENOUS at 09:00

## 2018-04-02 RX ADMIN — ALVIMOPAN SCH MG: 12 CAPSULE ORAL at 21:36

## 2018-04-02 RX ADMIN — INSULIN ASPART SCH: 100 INJECTION, SOLUTION INTRAVENOUS; SUBCUTANEOUS at 17:00

## 2018-04-02 NOTE — HHI.PR
Subjective


Remarks


C/R Surg POD #4





afebrile, VSS


UO good


no SOB


+liquid stools





Objective


-





Vital Signs








  Date Time  Temp Pulse Resp B/P (MAP) Pulse Ox O2 Delivery O2 Flow Rate FiO2


 


4/2/18 21:51      Nasal Cannula 3.00 


 


4/2/18 21:38   16     


 


4/2/18 20:55     94   


 


4/2/18 20:00 98.8 103  103/57 (72)    








Result Diagram:  


4/2/18 0627                                                                    

            4/2/18 0627





Objective Remarks


PE





alert





Abd - soft, rounded, wound dry





A/P


Assessment and Plan


Imp:





OOB


decr IVF


check CXR


c. diff sent











Sedrick Valles MD Apr 2, 2018 23:08

## 2018-04-02 NOTE — RADRPT
EXAM DATE/TIME:  04/02/2018 19:55 

 

[facility] COMPARISON:     

No previous studies available for comparison.

 

                     

INDICATIONS :     

Short of breath.

                     

 

MEDICAL HISTORY :            

Chronic obstructive pulmonary disease. Osteoporosis. Hypertension.   

 

SURGICAL HISTORY :        

Lobectomy. Coronary artery stent.

 

ENCOUNTER:     

Subsequent                                        

 

ACUITY:     

1 week      

 

PAIN SCORE:     

Non-responsive.

 

LOCATION:     

Bilateral chest 

 

FINDINGS:     

A single view of the chest demonstrates subsegmental basilar airspace disease. Trace pleural fluid. H
ealing lower right rib fracture. Heart size normal. No pneumothorax.

 

CONCLUSION:     

1. Mild basilar airspace disease and small effusions. Heart size within normal limits.

 

 

 

 Edvin Bragg MD on April 02, 2018 at 20:11           

Board Certified Radiologist.

 This report was verified electronically.

## 2018-04-02 NOTE — HHI.PR
Subjective


Remarks


This is a pleasant 66 y/o Female, history of Lung cancer, brought in to ER with 

Abdominal pain nausea and vomit, 


followed by GI specialist, as we know she has COPD, Hypertension, Right Upper 

lobe squamous cell carcinoma and


left upper lung adenocarcinoma, S/P left upper lobectomy and mediastinal lymph 

node dissection. Admits to occasion 


ETOH.  Smokes 10 cigarettes a day. Denies illicit drug use.  Also admits to 

taking a lot of Ibuprofen recently for back pain.  


3/30/18 s/p EGD and colonoscopy 3/29 found esophagitis, gastritis, colon polyps

, colon torsion vs volvulus.  CRS 


consulted and she is s/p ex lap and right colectomy.  Dr. Valles apparently the 

patient did have colonic volvulus and 


had infarcted her small bowel GI specialist signed off. Status post laparotomy 

and Right colectomy by Dr. Valles.





3/31: Seen in her bedroom in the presence of her  Mr. Koenig, she is been 

coughing will continue Bronchodilator


Mucolytic and incentive spirometry.





4/01: Stable in her bedroom again with her  Mr. Koenig, continue replacing 

electrolytes





4/02: Her  present no nausea, vomit or diarrhea, her  present.





Objective





Vital Signs








  Date Time  Temp Pulse Resp B/P (MAP) Pulse Ox O2 Delivery O2 Flow Rate FiO2


 


4/2/18 04:49   20     


 


4/2/18 04:00 98.6 101 18 121/67 (85) 96   


 


4/2/18 00:00 98.4 103 18 117/58 (77) 93   


 


4/1/18 23:21     92 Nasal Cannula 4.50 


 


4/1/18 20:42   20     


 


4/1/18 20:00 99.0 107 18 116/60 (78) 94   


 


4/1/18 19:45      Nasal Cannula  


 


4/1/18 16:00 98.7 106 16 120/76 (91) 94   


 


4/1/18 15:48     94 Nasal Cannula 4.00 


 


4/1/18 11:42 98.0 97 17 135/68 (90) 94   


 


4/1/18 08:14      Nasal Cannula 4.00 














I/O      


 


 4/1/18 4/1/18 4/1/18 4/2/18 4/2/18 4/2/18





 07:00 15:00 23:00 07:00 15:00 23:00


 


Intake Total 360 ml 460 ml 975 ml 240 ml  


 


Output Total 650 ml     


 


Balance -290 ml 460 ml 975 ml 240 ml  


 


      


 


Intake Oral 360 ml  475 ml 240 ml  


 


IV Total  460 ml 500 ml   


 


Output Urine Total 650 ml     


 


# Voids   100 3  


 


# Bowel Movements 1  1 2  








Result Diagram:  


3/31/18 0725                                                                   

             4/1/18 0400





Imaging





Last Impressions








Abdomen/Pelvis CT 3/28/18 0000 Signed





Impressions: 





 Service Date/Time:  Wednesday, March 28, 2018 01:04 - CONCLUSION:  1. Small 





 amount of pelvic free fluid. 2. Normal appendix. 3. Gaseous distention of the 





 ascending colon without obstruction.     Jared Peralta MD 


 


Abdomen X-Ray 3/27/18 [id number] Signed





Impressions: 





 Service Date/Time:  Wednesday, March 28, 2018 00:02 - CONCLUSION:  Mild 

gaseous 





 distention of bowel loops in right abdomen without obstruction. Round 





 benign-appearing calcification to the left of L4 vertebral body.     Jared Peralta MD 








Procedures


EGD


s/p exp laparotomy and right colectomy by Dr. Valles


Other Results





Laboratory Tests








Test


  3/28/18


00:20 3/28/18


00:24 3/30/18


07:27 3/31/18


07:25


 


Prothrombin Time 10.1 SEC    


 


Prothromb Time International


Ratio 1.0 RATIO 


  


  


  


 


 


Activated Partial


Thromboplast Time 22.6 SEC 


  


  


  


 


 


Lactic Acid Level 0.5 mmol/L    


 


Lipase 146 U/L    


 


Urine Color  YELLOW   


 


Urine Turbidity  CLEAR   


 


Urine pH  6.5   


 


Urine Specific Gravity  1.010   


 


Urine Protein  TRACE mg/dL   


 


Urine Glucose (UA)  NEG mg/dL   


 


Urine Ketones  10 mg/dL   


 


Urine Occult Blood  SMALL   


 


Urine Nitrite  NEG   


 


Urine Bilirubin  NEG   


 


Urine Urobilinogen


  


  LESS THAN 2.0


MG/DL 


  


 


 


Urine Leukocyte Esterase  TRACE   


 


Urine RBC  26 /hpf   


 


Urine WBC  3 /hpf   


 


Urine Squamous Epithelial


Cells 


  3 /hpf 


  


  


 


 


Urine Amorphous Sediment  RARE   


 


Urine Mucus  FEW /lpf   


 


Microscopic Urinalysis Comment


  


  CULT NOT


INDICATED 


  


 


 


Differential Total Cells


Counted 


  


  100 


  


 


 


Neutrophils % (Manual)   87 %  


 


Band Neutrophils %   10 %  


 


Lymphocytes %   2 %  


 


Monocytes %   1 %  


 


Neutrophils # (Manual)   15.5 TH/MM3  


 


Platelet Estimate   NORMAL  


 


Platelet Morphology Comment   NORMAL  


 


Ovalocytes   1+  


 


Acanthocytes   OCC  


 


Blood Urea Nitrogen   16 MG/DL  7 MG/DL 


 


Creatinine   0.67 MG/DL  0.49 MG/DL 


 


Random Glucose   226 MG/DL  127 MG/DL 


 


Total Protein   4.7 GM/DL  5.2 GM/DL 


 


Albumin   2.1 GM/DL  


 


Calcium Level   6.9 MG/DL  7.1 MG/DL 


 


Phosphorus Level   1.7 MG/DL  


 


Magnesium Level   2.9 MG/DL  


 


Alkaline Phosphatase   43 U/L  


 


Aspartate Amino Transf


(AST/SGOT) 


  


  25 U/L 


  


 


 


Alanine Aminotransferase


(ALT/SGPT) 


  


  18 U/L 


  


 


 


Total Bilirubin   0.2 MG/DL  


 


Sodium Level   145 MEQ/L  148 MEQ/L 


 


Potassium Level   3.4 MEQ/L  3.1 MEQ/L 


 


Chloride Level   110 MEQ/L  112 MEQ/L 


 


Carbon Dioxide Level   31.1 MEQ/L  30.6 MEQ/L 


 


Protein Corrected Calcium    8.1 MG/DL 


 


Test


  4/1/18


04:00 4/2/18


06:27 


  


 


 


Estimat Glomerular Filtration


Rate 102 ML/MIN 


  


  


  


 


 


White Blood Count  10.7 TH/MM3   


 


Red Blood Count  3.59 MIL/MM3   


 


Hemoglobin  10.8 GM/DL   


 


Hematocrit  31.7 %   


 


Mean Corpuscular Volume  88.3 FL   


 


Mean Corpuscular Hemoglobin  30.0 PG   


 


Mean Corpuscular Hemoglobin


Concent 


  34.0 % 


  


  


 


 


Red Cell Distribution Width  14.4 %   


 


Platelet Count  262 TH/MM3   


 


Mean Platelet Volume  7.7 FL   


 


Neutrophils (%) (Auto)  81.5 %   


 


Lymphocytes (%) (Auto)  12.8 %   


 


Monocytes (%) (Auto)  4.9 %   


 


Eosinophils (%) (Auto)  0.4 %   


 


Basophils (%) (Auto)  0.4 %   


 


Neutrophils # (Auto)  8.7 TH/MM3   


 


Lymphocytes # (Auto)  1.4 TH/MM3   


 


Monocytes # (Auto)  0.5 TH/MM3   


 


Eosinophils # (Auto)  0.0 TH/MM3   


 


Basophils # (Auto)  0.0 TH/MM3   


 


CBC Comment  DIFF FINAL   


 


Differential Comment     








Objective Remarks


GENERAL: No acute distress.


SKIN: Warm and dry.


HEAD: Atraumatic. Normocephalic. 


EYES: Pupils equal and round. No scleral icterus. No injection or drainage. 


ENT: No nasal bleeding or discharge.  Mucous membranes pink and moist.  NG tube 

right nare.


NECK: Trachea midline. 


CARDIOVASCULAR: Regular rate and rhythm.  


RESPIRATORY: No accessory muscle use. Clear to auscultation. Breath sounds 

equal bilaterally. 


GASTROINTESTINAL: Abdomen postop binder in place, hypoactive BS.


MUSCULOSKELETAL: Extremities without clubbing, cyanosis, or edema. No obvious 

deformities. 


NEUROLOGICAL: Awake and alert. No obvious cranial nerve deficits.  Motor 

grossly within normal limits. Normal speech.


PSYCHIATRIC: Appropriate mood and affect; insight and judgment normal.


Medications and IVs





Current Medications








 Medications


  (Trade)  Dose


 Ordered  Sig/Trung


 Route  Start Time


 Stop Time Status Last Admin


 


  (Dilaudid Pf Inj)  1 mg  Q4H  PRN


 IV  3/28/18 02:45


    3/29/18 03:02


 


 


  (Norvasc)  5 mg  DAILY


 PO  3/28/18 11:00


    4/1/18 08:04


 


 


  (Lipitor)  80 mg  HS


 PO  3/28/18 21:00


    4/1/18 20:42


 


 


  (ZyrTEC)  10 mg  DAILY


 PO  3/28/18 12:00


    4/1/18 08:03


 


 


  (Prinivil)  40 mg  BID


 PO  3/28/18 11:30


    4/1/18 20:42


 


 


  (Theragran M Tab)  1 tab  DAILY


 PO  3/28/18 12:00


    4/1/18 08:04


 


 


  (NS Flush)  2 ml  UNSCH  PRN


 IV FLUSH  3/28/18 10:15


     


 


 


  (NS Flush)  2 ml  BID


 IV FLUSH  3/28/18 21:00


    4/1/18 20:40


 


 


  (Tylenol)  650 mg  Q4H  PRN


 PO  3/28/18 10:15


     


 


 


  (Zofran Inj)  4 mg  Q6H  PRN


 IVP  3/28/18 10:15


    3/29/18 08:58


 


 


  (Reglan Inj)  5 mg  Q6H  PRN


 IV PUSH  3/28/18 10:15


     


 


 


  (Ambien)  5 mg  HS  PRN


 PO  3/28/18 10:15


     


 


 


  (Percocet  5-325


 Mg)  1 tab  Q6H  PRN


 PO  3/28/18 10:15


    3/29/18 09:08


 


 


  (Percocet 


 Mg)  1 tab  Q6H  PRN


 PO  3/28/18 10:15


    3/29/18 18:04


 


 


  (Habitrol 14 Mg


 Patch.24 Hr)  1 patch  DAILY


 T-DERMAL  3/29/18 09:00


     


 


 


 Miscellaneous


 Information  1  DAILY


 T-DERMAL  3/29/18 09:00


     


 


 


  (Entereg)  12 mg  BID


 PO  3/29/18 23:45


 4/5/18 09:01  4/1/18 20:42


 


 


  (Reglan Inj)  10 mg  Q12HR


 IVS  3/30/18 09:00


    4/1/18 20:41


 


 


  (Vasotec Inj)  1.25 mg  Q4H  PRN


 IV PUSH  3/29/18 23:45


     


 


 


  (Vasotec Inj)  2.5 mg  Q6H  PRN


 IV PUSH  3/29/18 23:45


     


 


 


  (Chloraseptic


 Dewey)  1 lozenge  UNSCH  PRN


 BUCCAL  3/29/18 23:45


     


 


 


  (Narcan Inj)  0.4 mg  UNSCH  PRN


 IV PUSH  3/29/18 23:45


     


 


 


  (Morphine 1 Mg/


 ml PCA)  30 mg  UNSCH


 IV  3/29/18 23:45


    4/1/18 04:55


 


 


 PCA Dosage


 Infused (Pha)  1  Q8HR


 .XX  3/29/18 23:45


    4/2/18 04:49


 


 


 Cefazolin Sodium


 1000 mg/Sodium


 Chloride  100 ml @ 


 200 mls/hr  Q8H


 IV  3/30/18 14:00


 4/2/18 13:59  4/2/18 04:27


 


 


 Metronidazole  100 ml @ 


 100 mls/hr  Q8H


 IV  3/30/18 20:00


 4/2/18 19:59  4/2/18 04:27


 


 


  (Duoneb Neb)  1 ampule  Q4HR  NEB


 NEB  3/31/18 12:00


    4/2/18 04:03


 


 


  (Albuterol Neb)  2.5 mg  Q6HR NEB  PRN


 NEB  3/31/18 12:45


     


 


 


  (Lasix Inj)  20 mg  BID@09,18


 IV PUSH  3/31/18 18:00


    4/1/18 16:04


 


 


  (Mucinex Er)  600 mg  BID


 PO  4/1/18 21:00


    4/1/18 20:42


 











A/P


Assessment and Plan


//Cecal volvulus


   -s/p exploratory laparotomy and right colectomy performed by Dr. Valles


   -pain management per surgery


   -PT eval/tx


   -started on liquid diet by General Surgery. 


   -follow up on path results





//Leukocytosis Improved, continue Ancef and Flagyl IV, 





//Anemia secondary to anemia of acute blood loss/postop


   -no active bleeding hemoglobin 10.8





//Hx of lung cancer s/p resection


   -resume home bronchodilator therapy - patients  to bring in from home


   -scheduled Bronchodilator, Mucolytic and incentive spirometry. 


   -supplemental oxygen to maintain O2 sats >92%





//Electrolyte derangement, replaced, today magnesium 1.8 giving 1 gram of 

magnesium sulfate


             Potassium 3.6 giving 40 meq by mouth and Phosphorus 2 giving 15 

mmol of Potassium Phosphate. 





//Hyperglycemia


   -Not yet got the Hemoglobin A1C asked again. 


   -blood sugar check and continue sliding scale. 





//DVT prophylaxis


   -Bilateral scd/delilah hose


             Encourage ambulation. 


             Lovenox.


Discharge Planning


Once cleared by General Surgery.











Emiliano Koo MD Apr 2, 2018 08:06

## 2018-04-03 VITALS
OXYGEN SATURATION: 96 % | RESPIRATION RATE: 16 BRPM | DIASTOLIC BLOOD PRESSURE: 64 MMHG | HEART RATE: 107 BPM | SYSTOLIC BLOOD PRESSURE: 114 MMHG | TEMPERATURE: 98.2 F

## 2018-04-03 VITALS
SYSTOLIC BLOOD PRESSURE: 109 MMHG | TEMPERATURE: 97.9 F | OXYGEN SATURATION: 95 % | RESPIRATION RATE: 19 BRPM | HEART RATE: 110 BPM | DIASTOLIC BLOOD PRESSURE: 61 MMHG

## 2018-04-03 VITALS
DIASTOLIC BLOOD PRESSURE: 55 MMHG | TEMPERATURE: 97.9 F | RESPIRATION RATE: 20 BRPM | OXYGEN SATURATION: 96 % | SYSTOLIC BLOOD PRESSURE: 109 MMHG | HEART RATE: 100 BPM

## 2018-04-03 VITALS
TEMPERATURE: 97.3 F | OXYGEN SATURATION: 97 % | RESPIRATION RATE: 16 BRPM | HEART RATE: 97 BPM | SYSTOLIC BLOOD PRESSURE: 105 MMHG | DIASTOLIC BLOOD PRESSURE: 61 MMHG

## 2018-04-03 VITALS — OXYGEN SATURATION: 97 %

## 2018-04-03 VITALS — OXYGEN SATURATION: 98 %

## 2018-04-03 VITALS
SYSTOLIC BLOOD PRESSURE: 134 MMHG | DIASTOLIC BLOOD PRESSURE: 57 MMHG | RESPIRATION RATE: 20 BRPM | TEMPERATURE: 97.7 F | OXYGEN SATURATION: 92 % | HEART RATE: 114 BPM

## 2018-04-03 VITALS
RESPIRATION RATE: 16 BRPM | TEMPERATURE: 97.9 F | HEART RATE: 120 BPM | SYSTOLIC BLOOD PRESSURE: 134 MMHG | DIASTOLIC BLOOD PRESSURE: 66 MMHG | OXYGEN SATURATION: 92 %

## 2018-04-03 LAB
HBA1C MFR BLD: 5.5 % (ref 4.3–6)
PHOSPHATE SERPL-MCNC: 2.7 MG/DL (ref 2.5–4.9)

## 2018-04-03 RX ADMIN — MULTIPLE VITAMINS W/ MINERALS TAB SCH TAB: TAB at 08:46

## 2018-04-03 RX ADMIN — ENOXAPARIN SODIUM SCH MG: 40 INJECTION SUBCUTANEOUS at 13:55

## 2018-04-03 RX ADMIN — Medication SCH ML: at 08:47

## 2018-04-03 RX ADMIN — IPRATROPIUM BROMIDE AND ALBUTEROL SULFATE SCH AMPULE: .5; 3 SOLUTION RESPIRATORY (INHALATION) at 12:20

## 2018-04-03 RX ADMIN — IPRATROPIUM BROMIDE AND ALBUTEROL SULFATE SCH AMPULE: .5; 3 SOLUTION RESPIRATORY (INHALATION) at 15:58

## 2018-04-03 RX ADMIN — GUAIFENESIN SCH MG: 600 TABLET, EXTENDED RELEASE ORAL at 20:58

## 2018-04-03 RX ADMIN — GUAIFENESIN SCH MG: 600 TABLET, EXTENDED RELEASE ORAL at 08:46

## 2018-04-03 RX ADMIN — OXYCODONE HYDROCHLORIDE AND ACETAMINOPHEN PRN TAB: 10; 325 TABLET ORAL at 21:00

## 2018-04-03 RX ADMIN — THIAMINE HYDROCHLORIDE SCH MLS/HR: 100 INJECTION, SOLUTION INTRAMUSCULAR; INTRAVENOUS at 06:15

## 2018-04-03 RX ADMIN — THIAMINE HYDROCHLORIDE SCH MLS/HR: 100 INJECTION, SOLUTION INTRAMUSCULAR; INTRAVENOUS at 21:04

## 2018-04-03 RX ADMIN — IPRATROPIUM BROMIDE AND ALBUTEROL SULFATE SCH AMPULE: .5; 3 SOLUTION RESPIRATORY (INHALATION) at 03:44

## 2018-04-03 RX ADMIN — LISINOPRIL SCH MG: 20 TABLET ORAL at 21:06

## 2018-04-03 RX ADMIN — CETIRIZINE HYDROCHLORIDE SCH MG: 10 TABLET, FILM COATED ORAL at 08:46

## 2018-04-03 RX ADMIN — INSULIN ASPART SCH: 100 INJECTION, SOLUTION INTRAVENOUS; SUBCUTANEOUS at 08:00

## 2018-04-03 RX ADMIN — INSULIN ASPART SCH: 100 INJECTION, SOLUTION INTRAVENOUS; SUBCUTANEOUS at 16:55

## 2018-04-03 RX ADMIN — FUROSEMIDE SCH MG: 10 INJECTION, SOLUTION INTRAMUSCULAR; INTRAVENOUS at 08:45

## 2018-04-03 RX ADMIN — LISINOPRIL SCH MG: 20 TABLET ORAL at 08:46

## 2018-04-03 RX ADMIN — FUROSEMIDE SCH MG: 10 INJECTION, SOLUTION INTRAMUSCULAR; INTRAVENOUS at 17:58

## 2018-04-03 RX ADMIN — MORPHINE SULFATE SCH MG: 1 INJECTION, SOLUTION INTRAVENOUS at 12:46

## 2018-04-03 RX ADMIN — Medication SCH ML: at 21:02

## 2018-04-03 RX ADMIN — FAMOTIDINE SCH MG: 20 TABLET, FILM COATED ORAL at 20:58

## 2018-04-03 RX ADMIN — ALVIMOPAN SCH MG: 12 CAPSULE ORAL at 20:58

## 2018-04-03 RX ADMIN — IPRATROPIUM BROMIDE AND ALBUTEROL SULFATE SCH AMPULE: .5; 3 SOLUTION RESPIRATORY (INHALATION) at 20:44

## 2018-04-03 RX ADMIN — INSULIN ASPART SCH: 100 INJECTION, SOLUTION INTRAVENOUS; SUBCUTANEOUS at 11:42

## 2018-04-03 RX ADMIN — INSULIN ASPART SCH: 100 INJECTION, SOLUTION INTRAVENOUS; SUBCUTANEOUS at 21:00

## 2018-04-03 RX ADMIN — ATORVASTATIN CALCIUM SCH MG: 80 TABLET, FILM COATED ORAL at 20:58

## 2018-04-03 RX ADMIN — ALVIMOPAN SCH MG: 12 CAPSULE ORAL at 08:46

## 2018-04-03 RX ADMIN — IPRATROPIUM BROMIDE AND ALBUTEROL SULFATE SCH AMPULE: .5; 3 SOLUTION RESPIRATORY (INHALATION) at 07:45

## 2018-04-03 RX ADMIN — NICOTINE SCH PATCH: 14 PATCH, EXTENDED RELEASE TOPICAL at 08:51

## 2018-04-03 NOTE — HHI.PR
Subjective


Remarks


C/R Surg POD #5





afebrile, VSS


UO good


no SOB


+liquid stools


better PO





Objective


-





Vital Signs








  Date Time  Temp Pulse Resp B/P (MAP) Pulse Ox O2 Delivery O2 Flow Rate FiO2


 


4/3/18 16:00 97.9 120 16 134/66 (88) 92   


 


4/3/18 15:58      Nasal Cannula 3.00 








Result Diagram:  


4/2/18 0627                                                                    

            4/3/18 0441





Objective Remarks


PE





alert





Abd - soft, less tympany, wound dry





A/P


Assessment and Plan


Imp:





OOB


decr IVF





c. diff sent, neg











Sedrick Valles MD Apr 3, 2018 21:19

## 2018-04-03 NOTE — HHI.PR
Subjective


Remarks


This is a pleasant 66 y/o Female, history of Lung cancer, brought in to ER with 

Abdominal pain nausea and vomit, 


followed by GI specialist, as we know she has COPD, Hypertension, Right Upper 

lobe squamous cell carcinoma and


left upper lung adenocarcinoma, S/P left upper lobectomy and mediastinal lymph 

node dissection. Admits to occasion 


ETOH.  Smokes 10 cigarettes a day. Denies illicit drug use.  Also admits to 

taking a lot of Ibuprofen recently for back pain.  


3/30/18 s/p EGD and colonoscopy 3/29 found esophagitis, gastritis, colon polyps

, colon torsion vs volvulus.  CRS 


consulted and she is s/p ex lap and right colectomy.  Dr. Valles apparently the 

patient did have colonic volvulus and 


had infarcted her small bowel GI specialist signed off. Status post laparotomy 

and Right colectomy by Dr. Valles.





3/31: Seen in her bedroom in the presence of her  Mr. Koenig, she is been 

coughing will continue Bronchodilator


Mucolytic and incentive spirometry.





4/01: Stable in her bedroom again with her  Mr. Koenig, continue replacing 

electrolytes





4/02: Her  present no nausea, vomit or diarrhea, her  present. 





4/03: Loose stools as expected secondary to resolution of Volvulus. stable in 

her bedroom, C Diff was negative, 


        no nausea, vomit or diarrhea.





Objective





Vital Signs








  Date Time  Temp Pulse Resp B/P (MAP) Pulse Ox O2 Delivery O2 Flow Rate FiO2


 


4/3/18 08:00 97.3 97 16 105/61 (76) 97   


 


4/3/18 07:45     98 Nasal Cannula 3.00 


 


4/3/18 05:04   18     


 


4/3/18 04:00 97.9 100 20 109/55 (73) 96   


 


4/3/18 00:00 97.7 114 20 134/57 (82) 92   


 


4/2/18 21:51      Nasal Cannula 3.00 


 


4/2/18 21:38   16     


 


4/2/18 20:55     94 Nasal Cannula 3.00 


 


4/2/18 20:00 98.8 103 20 103/57 (72) 96   


 


4/2/18 16:22       3.00 


 


4/2/18 16:00 98.3 108 16 98/55 (69) 98   


 


4/2/18 12:00 98.8 109 16 114/61 (78) 96   














I/O      


 


 4/2/18 4/2/18 4/2/18 4/3/18 4/3/18 4/3/18





 07:00 15:00 23:00 07:00 15:00 23:00


 


Intake Total 240 ml  600 ml   


 


Output Total    251 ml  


 


Balance 240 ml  600 ml -251 ml  


 


      


 


Intake Oral 240 ml  600 ml   


 


Output Urine Total    250 ml  


 


Stool Total    1 ml  


 


# Voids 3  4   


 


# Bowel Movements 2  4 1  








Result Diagram:  


4/2/18 0627                                                                    

            4/3/18 0441





Imaging





Last Impressions








Chest X-Ray 4/2/18 0000 Signed





Impressions: 





 Service Date/Time:  Monday, April 2, 2018 19:55 - CONCLUSION:  1. Mild basilar 





 airspace disease and small effusions. Heart size within normal limits.     





 Edvin Bragg MD 


 


Abdomen/Pelvis CT 3/28/18 0000 Signed





Impressions: 





 Service Date/Time:  Wednesday, March 28, 2018 01:04 - CONCLUSION:  1. Small 





 amount of pelvic free fluid. 2. Normal appendix. 3. Gaseous distention of the 





 ascending colon without obstruction.     Jared Peralta MD 


 


Abdomen X-Ray 3/27/18 2513 Signed





Impressions: 





 Service Date/Time:  Wednesday, March 28, 2018 00:02 - CONCLUSION:  Mild 

gaseous 





 distention of bowel loops in right abdomen without obstruction. Round 





 benign-appearing calcification to the left of L4 vertebral body.     Jared Peralta MD 








Procedures


EGD


s/p exp laparotomy and right colectomy by Dr. Valles


Other Results





Laboratory Tests








Test


  3/28/18


00:20 3/28/18


00:24 3/30/18


07:27 3/31/18


07:25


 


Prothrombin Time 10.1 SEC    


 


Prothromb Time International


Ratio 1.0 RATIO 


  


  


  


 


 


Activated Partial


Thromboplast Time 22.6 SEC 


  


  


  


 


 


Lactic Acid Level 0.5 mmol/L    


 


Lipase 146 U/L    


 


Urine Color  YELLOW   


 


Urine Turbidity  CLEAR   


 


Urine pH  6.5   


 


Urine Specific Gravity  1.010   


 


Urine Protein  TRACE mg/dL   


 


Urine Glucose (UA)  NEG mg/dL   


 


Urine Ketones  10 mg/dL   


 


Urine Occult Blood  SMALL   


 


Urine Nitrite  NEG   


 


Urine Bilirubin  NEG   


 


Urine Urobilinogen


  


  LESS THAN 2.0


MG/DL 


  


 


 


Urine Leukocyte Esterase  TRACE   


 


Urine RBC  26 /hpf   


 


Urine WBC  3 /hpf   


 


Urine Squamous Epithelial


Cells 


  3 /hpf 


  


  


 


 


Urine Amorphous Sediment  RARE   


 


Urine Mucus  FEW /lpf   


 


Microscopic Urinalysis Comment


  


  CULT NOT


INDICATED 


  


 


 


Differential Total Cells


Counted 


  


  100 


  


 


 


Neutrophils % (Manual)   87 %  


 


Band Neutrophils %   10 %  


 


Lymphocytes %   2 %  


 


Monocytes %   1 %  


 


Neutrophils # (Manual)   15.5 TH/MM3  


 


Platelet Estimate   NORMAL  


 


Platelet Morphology Comment   NORMAL  


 


Ovalocytes   1+  


 


Acanthocytes   OCC  


 


Blood Urea Nitrogen   16 MG/DL  7 MG/DL 


 


Creatinine   0.67 MG/DL  0.49 MG/DL 


 


Random Glucose   226 MG/DL  127 MG/DL 


 


Total Protein   4.7 GM/DL  5.2 GM/DL 


 


Albumin   2.1 GM/DL  


 


Calcium Level   6.9 MG/DL  7.1 MG/DL 


 


Phosphorus Level   1.7 MG/DL  


 


Magnesium Level   2.9 MG/DL  


 


Alkaline Phosphatase   43 U/L  


 


Aspartate Amino Transf


(AST/SGOT) 


  


  25 U/L 


  


 


 


Alanine Aminotransferase


(ALT/SGPT) 


  


  18 U/L 


  


 


 


Total Bilirubin   0.2 MG/DL  


 


Sodium Level   145 MEQ/L  148 MEQ/L 


 


Potassium Level   3.4 MEQ/L  3.1 MEQ/L 


 


Chloride Level   110 MEQ/L  112 MEQ/L 


 


Carbon Dioxide Level   31.1 MEQ/L  30.6 MEQ/L 


 


Protein Corrected Calcium    8.1 MG/DL 


 


Test


  4/2/18


06:27 4/2/18


18:06 4/3/18


04:41 


 


 


White Blood Count 10.7 TH/MM3    


 


Red Blood Count 3.59 MIL/MM3    


 


Hemoglobin 10.8 GM/DL    


 


Hematocrit 31.7 %    


 


Mean Corpuscular Volume 88.3 FL    


 


Mean Corpuscular Hemoglobin 30.0 PG    


 


Mean Corpuscular Hemoglobin


Concent 34.0 % 


  


  


  


 


 


Red Cell Distribution Width 14.4 %    


 


Platelet Count 262 TH/MM3    


 


Mean Platelet Volume 7.7 FL    


 


Neutrophils (%) (Auto) 81.5 %    


 


Lymphocytes (%) (Auto) 12.8 %    


 


Monocytes (%) (Auto) 4.9 %    


 


Eosinophils (%) (Auto) 0.4 %    


 


Basophils (%) (Auto) 0.4 %    


 


Neutrophils # (Auto) 8.7 TH/MM3    


 


Lymphocytes # (Auto) 1.4 TH/MM3    


 


Monocytes # (Auto) 0.5 TH/MM3    


 


Eosinophils # (Auto) 0.0 TH/MM3    


 


Basophils # (Auto) 0.0 TH/MM3    


 


CBC Comment DIFF FINAL    


 


Differential Comment     


 


Blood Urea Nitrogen 7 MG/DL    


 


Creatinine 0.54 MG/DL    


 


Random Glucose 113 MG/DL    


 


Calcium Level 7.5 MG/DL    


 


Phosphorus Level 2.0 MG/DL   2.7 MG/DL  


 


Magnesium Level 1.8 MG/DL    


 


Sodium Level 142 MEQ/L    


 


Potassium Level 3.6 MEQ/L   3.8 MEQ/L  


 


Chloride Level 108 MEQ/L    


 


Carbon Dioxide Level 26.3 MEQ/L    


 


Anion Gap 8 MEQ/L    


 


Estimat Glomerular Filtration


Rate 113 ML/MIN 


  


  


  


 


 


Stool C. difficile Toxin (PCR)  NEGATIVE   


 


Stl C. difficile Toxin


Epiderm 027 


  PRESUMPTIVE


NEGATIVE 


  


 








Objective Remarks


GENERAL: No acute distress.


SKIN: Warm and dry.


HEAD: Atraumatic. Normocephalic. 


EYES: Pupils equal and round. No scleral icterus. No injection or drainage. 


ENT: No nasal bleeding or discharge.  Mucous membranes pink and moist.  NG tube 

right nare.


NECK: Trachea midline. 


CARDIOVASCULAR: Regular rate and rhythm.  


RESPIRATORY: No accessory muscle use. Clear to auscultation. Breath sounds 

equal bilaterally. 


GASTROINTESTINAL: Abdomen postop binder in place, hypoactive BS.


MUSCULOSKELETAL: Extremities without clubbing, cyanosis, or edema. No obvious 

deformities. 


NEUROLOGICAL: Awake and alert. No obvious cranial nerve deficits.  Motor 

grossly within normal limits. Normal speech.


PSYCHIATRIC: Appropriate mood and affect; insight and judgment normal.


Medications and IVs





Current Medications








 Medications


  (Trade)  Dose


 Ordered  Sig/Trung


 Route  Start Time


 Stop Time Status Last Admin


 


  (Dilaudid Pf Inj)  1 mg  Q4H  PRN


 IV  3/28/18 02:45


    3/29/18 03:02


 


 


  (Norvasc)  5 mg  DAILY


 PO  3/28/18 11:00


    4/2/18 09:22


 


 


  (Lipitor)  80 mg  HS


 PO  3/28/18 21:00


    4/2/18 21:36


 


 


  (ZyrTEC)  10 mg  DAILY


 PO  3/28/18 12:00


    4/2/18 09:21


 


 


  (Prinivil)  40 mg  BID


 PO  3/28/18 11:30


    4/2/18 21:37


 


 


  (Theragran M Tab)  1 tab  DAILY


 PO  3/28/18 12:00


    4/2/18 09:21


 


 


  (NS Flush)  2 ml  UNSCH  PRN


 IV FLUSH  3/28/18 10:15


     


 


 


  (NS Flush)  2 ml  BID


 IV FLUSH  3/28/18 21:00


    4/2/18 21:42


 


 


  (Tylenol)  650 mg  Q4H  PRN


 PO  3/28/18 10:15


     


 


 


  (Zofran Inj)  4 mg  Q6H  PRN


 IVP  3/28/18 10:15


    3/29/18 08:58


 


 


  (Ambien)  5 mg  HS  PRN


 PO  3/28/18 10:15


     


 


 


  (Percocet  5-325


 Mg)  1 tab  Q6H  PRN


 PO  3/28/18 10:15


    3/29/18 09:08


 


 


  (Percocet 


 Mg)  1 tab  Q6H  PRN


 PO  3/28/18 10:15


    3/29/18 18:04


 


 


  (Habitrol 14 Mg


 Patch.24 Hr)  1 patch  DAILY


 T-DERMAL  3/29/18 09:00


     


 


 


 Miscellaneous


 Information  1  DAILY


 T-DERMAL  3/29/18 09:00


     


 


 


  (Entereg)  12 mg  BID


 PO  3/29/18 23:45


 4/5/18 09:01  4/2/18 21:36


 


 


  (Vasotec Inj)  1.25 mg  Q4H  PRN


 IV PUSH  3/29/18 23:45


     


 


 


  (Vasotec Inj)  2.5 mg  Q6H  PRN


 IV PUSH  3/29/18 23:45


     


 


 


  (Chloraseptic


 [place]wey)  1 lozenge  UNSCH  PRN


 BUCCAL  3/29/18 23:45


     


 


 


  (Narcan Inj)  0.4 mg  UNSCH  PRN


 IV PUSH  3/29/18 23:45


     


 


 


  (Morphine 1 Mg/


 ml PCA)  30 mg  UNSCH


 IV  3/29/18 23:45


    4/2/18 10:21


 


 


 PCA Dosage


 Infused (Pha)  1  Q8HR


 .XX  3/29/18 23:45


    4/3/18 05:04


 


 


  (Duoneb Neb)  1 ampule  Q4HR  NEB


 NEB  3/31/18 12:00


    4/3/18 07:45


 


 


  (Albuterol Neb)  2.5 mg  Q6HR NEB  PRN


 NEB  3/31/18 12:45


     


 


 


  (Lasix Inj)  20 mg  BID@09,18


 IV PUSH  3/31/18 18:00


    4/2/18 18:13


 


 


  (Mucinex Er)  600 mg  BID


 PO  4/1/18 21:00


    4/2/18 21:36


 


 


  (NovoLOG


 SUPPLEMENTAL


 SCALE)  1  ACHS SLIDING  SCALE


 SQ  4/2/18 12:00


    4/2/18 21:42


 


 


  (Lovenox Inj)  40 mg  Q24H


 SQ  4/2/18 14:00


    4/2/18 13:22


 


 


 Sodium Chloride  1,000 ml @ 


 70 mls/hr  [id number]


 IV  4/3/18 06:15


    4/3/18 06:15


 











A/P


Assessment and Plan


//Cecal volvulus


   -s/p exploratory laparotomy and right colectomy performed by Dr. Valles


   -pain management per surgery


   -PT eval/tx


   -started on liquid diet by General Surgery. 


   -follow up on path results





//Leukocytosis Improved, discontinued antibiotics by Surgery specialist. 





//Anemia secondary to anemia of acute blood loss/postop


   -no active bleeding hemoglobin 10.8





//Hx of lung cancer s/p resection


   -resume home bronchodilator therapy - patients  to bring in from home


   -scheduled Bronchodilator, Mucolytic and incentive spirometry. 


   -supplemental oxygen to maintain O2 sats >92%





//Electrolyte derangement, replaced, today magnesium 1.8 giving 1 gram of 

magnesium sulfate


             Potassium 3.6 giving 40 meq by mouth and Phosphorus 2 giving 15 

mmol of Potassium Phosphate. 





//Hyperglycemia


   -hemoglobin A1C 5.5





//DVT prophylaxis


   -Bilateral scd/delilah hose


             Encourage ambulation. 


             Lovenox.


Discharge Planning


Once cleared by General Surgery.











Emiliano Koo MD Apr 3, 2018 08:45

## 2018-04-04 VITALS — OXYGEN SATURATION: 95 %

## 2018-04-04 VITALS
DIASTOLIC BLOOD PRESSURE: 53 MMHG | RESPIRATION RATE: 18 BRPM | TEMPERATURE: 97.7 F | HEART RATE: 95 BPM | OXYGEN SATURATION: 97 % | SYSTOLIC BLOOD PRESSURE: 95 MMHG

## 2018-04-04 VITALS — OXYGEN SATURATION: 97 %

## 2018-04-04 VITALS
DIASTOLIC BLOOD PRESSURE: 56 MMHG | RESPIRATION RATE: 19 BRPM | OXYGEN SATURATION: 99 % | HEART RATE: 96 BPM | TEMPERATURE: 96.9 F | SYSTOLIC BLOOD PRESSURE: 110 MMHG

## 2018-04-04 VITALS — OXYGEN SATURATION: 96 %

## 2018-04-04 VITALS — RESPIRATION RATE: 18 BRPM

## 2018-04-04 VITALS
TEMPERATURE: 98.1 F | HEART RATE: 102 BPM | RESPIRATION RATE: 18 BRPM | DIASTOLIC BLOOD PRESSURE: 56 MMHG | SYSTOLIC BLOOD PRESSURE: 111 MMHG | OXYGEN SATURATION: 95 %

## 2018-04-04 RX ADMIN — CETIRIZINE HYDROCHLORIDE SCH MG: 10 TABLET, FILM COATED ORAL at 08:03

## 2018-04-04 RX ADMIN — Medication SCH ML: at 08:02

## 2018-04-04 RX ADMIN — IPRATROPIUM BROMIDE AND ALBUTEROL SULFATE SCH AMPULE: .5; 3 SOLUTION RESPIRATORY (INHALATION) at 07:43

## 2018-04-04 RX ADMIN — FUROSEMIDE SCH MG: 10 INJECTION, SOLUTION INTRAMUSCULAR; INTRAVENOUS at 08:02

## 2018-04-04 RX ADMIN — INSULIN ASPART SCH: 100 INJECTION, SOLUTION INTRAVENOUS; SUBCUTANEOUS at 12:00

## 2018-04-04 RX ADMIN — GUAIFENESIN SCH MG: 600 TABLET, EXTENDED RELEASE ORAL at 08:02

## 2018-04-04 RX ADMIN — MULTIPLE VITAMINS W/ MINERALS TAB SCH TAB: TAB at 08:03

## 2018-04-04 RX ADMIN — THIAMINE HYDROCHLORIDE SCH MLS/HR: 100 INJECTION, SOLUTION INTRAMUSCULAR; INTRAVENOUS at 10:51

## 2018-04-04 RX ADMIN — INSULIN ASPART SCH: 100 INJECTION, SOLUTION INTRAVENOUS; SUBCUTANEOUS at 08:00

## 2018-04-04 RX ADMIN — ONDANSETRON PRN MG: 2 INJECTION, SOLUTION INTRAMUSCULAR; INTRAVENOUS at 14:48

## 2018-04-04 RX ADMIN — OXYCODONE HYDROCHLORIDE AND ACETAMINOPHEN PRN TAB: 10; 325 TABLET ORAL at 14:19

## 2018-04-04 RX ADMIN — FAMOTIDINE SCH MG: 20 TABLET, FILM COATED ORAL at 08:03

## 2018-04-04 RX ADMIN — IPRATROPIUM BROMIDE AND ALBUTEROL SULFATE SCH AMPULE: .5; 3 SOLUTION RESPIRATORY (INHALATION) at 02:44

## 2018-04-04 RX ADMIN — OXYCODONE HYDROCHLORIDE AND ACETAMINOPHEN PRN TAB: 5; 325 TABLET ORAL at 02:23

## 2018-04-04 RX ADMIN — IPRATROPIUM BROMIDE AND ALBUTEROL SULFATE SCH AMPULE: .5; 3 SOLUTION RESPIRATORY (INHALATION) at 00:34

## 2018-04-04 RX ADMIN — OXYCODONE HYDROCHLORIDE AND ACETAMINOPHEN PRN TAB: 10; 325 TABLET ORAL at 08:12

## 2018-04-04 RX ADMIN — NICOTINE SCH PATCH: 14 PATCH, EXTENDED RELEASE TOPICAL at 08:07

## 2018-04-04 RX ADMIN — ENOXAPARIN SODIUM SCH MG: 40 INJECTION SUBCUTANEOUS at 14:13

## 2018-04-04 RX ADMIN — LISINOPRIL SCH MG: 20 TABLET ORAL at 08:06

## 2018-04-04 RX ADMIN — ALVIMOPAN SCH MG: 12 CAPSULE ORAL at 08:03

## 2018-04-04 NOTE — HHI.FF
Face to Face Verification


Diagnosis:  


(1) Colonic obstruction


Physical Therapy


Order:  Evaluate and Treat, Improve ambulation, Strength and gait training





Home Health Nursing








Order: Medical education





 Signs/symptoms of disease process





 Oxygen administration education





 Medication education-adverse effect





 Nursing assessment with vital signs

















I have seen patient Yoon Fairchild on 4/4/18. My clinical findings support 

the need for the requested home health care services because:








 Ltd mobility - disease progression














I certify that my clinical findings support that this patient is homebound 

because:








 Unsafe to leave home unassisted

















Emiliano Koo MD Apr 4, 2018 10:19

## 2018-04-04 NOTE — HHI.DS
__________________________________________________





Discharge Summary


Admission Date


Mar 28, 2018 at 02:33


Discharge Date:  Apr 4, 2018


Admitting Diagnosis





Colonic Obstruction





(1) Colon obstruction


ICD Code:  K56.609 - Unspecified intestinal obstruction, unspecified as to 

partial versus complete obstruction


Diagnosis:  Principal





(2) Cecal volvulus


ICD Code:  K56.2 - Volvulus


Diagnosis:  Principal





Procedures


EGD


s/p exp laparotomy and right colectomy by Dr. Valles


Brief History - From Admission


Patient is a 67-year-old female.  History of lung cancer.  Presents with having 

right sided abdominal pain with some nausea and vomiting.  Has been unable to 

tolerate oral intake yesterday.  Patient denies any fevers denies any chills 

denies any sweating denies any dysuria urgency or frequency.  Denies any recent 

antibiotics.  Denies any contact with anyone sick.  Denies any strange food 

intake.  Denies any urinary symptoms.  States she has not had a bowel movement 

since yesterday


CBC/BMP:  


4/2/18 0627                                                                    

            4/3/18 0441





Significant Findings





Laboratory Tests








Test


  4/2/18


06:27 4/2/18


18:06 4/3/18


04:41


 


Red Blood Count


  3.59 MIL/MM3


(4.00-5.30) 


  


 


 


Hemoglobin


  10.8 GM/DL


(11.6-15.3) 


  


 


 


Hematocrit


  31.7 %


(35.0-46.0) 


  


 


 


Neutrophils (%) (Auto)


  81.5 %


(16.0-70.0) 


  


 


 


Neutrophils # (Auto)


  8.7 TH/MM3


(1.8-7.7) 


  


 


 


Random Glucose


  113 MG/DL


() 


  


 


 


Calcium Level


  7.5 MG/DL


(8.5-10.1) 


  


 


 


Phosphorus Level


  2.0 MG/DL


(2.5-4.9) 


  


 


 


Chloride Level


  108 MEQ/L


() 


  


 








Imaging





Last Impressions








Chest X-Ray 4/2/18 0000 Signed





Impressions: 





 Service Date/Time:  Monday, April 2, 2018 19:55 - CONCLUSION:  1. Mild basilar 





 airspace disease and small effusions. Heart size within normal limits.     





 Edvin Bragg MD 


 


Abdomen/Pelvis CT 3/28/18 0000 Signed





Impressions: 





 Service Date/Time:  Wednesday, March 28, 2018 01:04 - CONCLUSION:  1. Small 





 amount of pelvic free fluid. 2. Normal appendix. 3. Gaseous distention of the 





 ascending colon without obstruction.     Jared Peralta MD 


 


Abdomen X-Ray 3/27/18 2345 Signed





Impressions: 





 Service Date/Time:  Wednesday, March 28, 2018 00:02 - CONCLUSION:  Mild 

gaseous 





 distention of bowel loops in right abdomen without obstruction. Round 





 benign-appearing calcification to the left of L4 vertebral body.     Jared Peralta MD 








PE at Discharge


GENERAL: No acute distress.


SKIN: Warm and dry.


HEAD: Atraumatic. Normocephalic. 


EYES: Pupils equal and round. No scleral icterus. No injection or drainage. 


ENT: No nasal bleeding or discharge.  Mucous membranes pink and moist.  NG tube 

right nare.


NECK: Trachea midline. 


CARDIOVASCULAR: Regular rate and rhythm.  


RESPIRATORY: No accessory muscle use. Clear to auscultation. Breath sounds 

equal bilaterally. 


GASTROINTESTINAL: Abdomen postop binder in place, hypoactive BS.


MUSCULOSKELETAL: Extremities without clubbing, cyanosis, or edema. No obvious 

deformities. 


NEUROLOGICAL: Awake and alert. No obvious cranial nerve deficits.  Motor 

grossly within normal limits. Normal speech.


PSYCHIATRIC: Appropriate mood and affect; insight and judgment normal.


Hospital Course


This is a pleasant 68 y/o Female, history of Lung cancer, brought in to ER with 

Abdominal pain nausea and vomit, 


followed by GI specialist, as we know she has COPD, Hypertension, Right Upper 

lobe squamous cell carcinoma and


left upper lung adenocarcinoma, S/P left upper lobectomy and mediastinal lymph 

node dissection. Admits to occasion 


ETOH.  Smokes 10 cigarettes a day. Denies illicit drug use.  Also admits to 

taking a lot of Ibuprofen recently for back pain.  


3/30/18 s/p EGD and colonoscopy 3/29 found esophagitis, gastritis, colon polyps

, colon torsion vs volvulus.  CRS 


consulted and she is s/p ex lap and right colectomy.  Dr. Valles apparently the 

patient did have colonic volvulus and 


had infarcted her small bowel GI specialist signed off. Status post laparotomy 

and Right colectomy by Dr. Valles.





3/31: Seen in her bedroom in the presence of her  Mr. Koenig, she is been 

coughing will continue Bronchodilator


Mucolytic and incentive spirometry.





4/01: Stable in her bedroom again with her  Mr. Koenig, continue replacing 

electrolytes





4/02: Her  present no nausea, vomit or diarrhea, her  present. 





4/03: Loose stools as expected secondary to resolution of Volvulus. stable in 

her bedroom, C Diff was negative, 


        


4/04: Stable in her bedroom, no nausea, vomit or diarrhea, okay to discharge as 

per General Surgery, her  in the room


        Mr. Koenig.








Assessment and Plan


//Cecal volvulus


   -s/p exploratory laparotomy and right colectomy performed by Dr. Valles


   -pain management per surgery


   -PT eval/tx


   -tolerating diet, as per Surgery specialist okay to discharge Home with [facility]. 


   -follow up on path results





//Leukocytosis Improved, discontinued antibiotics by Surgery specialist. 





//Anemia secondary to anemia of acute blood loss/postop


   -no active bleeding hemoglobin 10.8





//Hx of lung cancer s/p resection


   -resume home bronchodilator therapy - patients  to bring in from home


   -scheduled Bronchodilator, Mucolytic and incentive spirometry. 


   -supplemental oxygen to maintain O2 sats >92%


             needs oxygen to keep Oxygen saturation over 92% at home. 





//Electrolyte derangement, replaced. 





//Hyperglycemia


   -hemoglobin A1C 5.5





//DVT prophylaxis


   -Bilateral scd/delilah hose


             Encourage ambulation. 


             Lovenox.


Discharge Planning


Discharge Home with [facility].


Pt Condition on Discharge:  Good


Discharge Disposition:  Disch w/ Home Health Serv


Discharge Time:  > 30 minutes


Discharge Instructions


DIET: Follow Instructions for:  Heart Healthy Diet


Activities you can perform:  Regular-No Restrictions











Emiliano Koo MD Apr 4, 2018 17:30

## 2018-04-05 NOTE — MP
cc:

Sedrick Valles MD

****

 

 

DATE OF OPERATION:

03/29/2018

 

PREOPERATIVE DIAGNOSIS:

Abdominal pain, possible cecal volvulus.

 

PROCEDURE PERFORMED:

Exploratory laparotomy, with resection of terminal ileum and right 

colon.

 

POSTOPERATIVE DIAGNOSIS:

Cecal volvulus with entrapped ischemic small bowel.

 

SURGEON:

Sedrick Valles MD

 

ASSISTANT:

Dr. David Meese

 

DESCRIPTION OF PROCEDURE:

The patient was placed in the supine position.  After adequate general

anesthesia, her legs were placed in universal stirrups and supported 

appropriately.  The abdomen was then prepped with Betadine solution 

and draped in the usual sterile fashion.  With Dr. Meese's assistance,

the abdomen was opened through a midline incision.  Upon entering the 

abdomen, there was some serosanguineous ascites.  Exploration revealed

the cecum and right colon to be extremely distended and twisted with a

volvulus stuck up above the liver.  This was untwisted ,noting loops 

of terminal ileum trapped under the mesentery and quite ischemic in 

nature for about the last 2, almost 3 feet.  After untwisting the 

volvulus, the terminal ileum and small bowel did not appear to show 

any signs of viability.  The proximal bowel was pretty unremarkable.  

The stomach and duodenum were normal.  The small bowel was gently 

dilated and full of air and liquid fluid throughout the proximal 

extent.  The colon distal to the volvulus appeared to be healthy.

 

First, the right colon was mobilized medially, noting very little 

retroperitoneal attachments.  The right ureter was identified and 

carefully preserved.  Dissection then proceeded up, taking down 

attachments to the hepatic flexure into the lesser sac and mobilizing 

the gastrocolic omentum off the transverse colon.  The proximal extent

of resection was chosen about 2-3 feet proximal to the ileocecal 

valve, with viability of the bowel appeared pretty obvious.  An 

avascular plane was created and the bowel divided using the ROSALEE 

stapling device.  The mesentery was then progressively divided, taking

the ileocolic pedicle between Kellys and obtaining hemostasis with 

Vicryl ties.  The bowel was then divided in the proximal transverse 

colon, again using the GI stapling device.  The right colon and 

terminal ileum were removed.

 

Bowel continuity was then restored by firing of the GI stapler across 

antimesenteric ends of the bowel, decompressing both the colon and the

small bowel prior to closing the enterotomy with a TA 60 stapling.  

The mesenteric defect closed with a running Vicryl suture and a 3-0 

Vicryl crotch suture was placed as well.  The abdomen was then 

irrigated copiously with normal saline.  Adequate hemostasis achieved.

 The midline incision was closed anatomically using a running #1 PDS 

suture to reapproximate the midline fascia.  The subcutaneous tissue 

was irrigated copiously and the skin closed with surgical staples.  

Wound area washed with normal saline and dried, sterile dressing of 

Telfa and gauze applied.  The patient tolerated the procedure quite 

well and was brought to the recovery room in stable condition.  Sponge

and needle counts were correct at the end of the procedure.

 

 

__________________________________

Sedrick Valles MD

 

 

[facility]

D: 04/04/2018, 10:47 PM

T: 04/04/2018, 11:21 PM

Visit #: [id number]

Job #: [id number]

## 2018-04-12 ENCOUNTER — HOSPITAL ENCOUNTER (EMERGENCY)
Dept: HOSPITAL 17 - NED | Age: 68
Discharge: LEFT BEFORE DISCHARGE | End: 2018-04-12
Payer: MEDICARE

## 2018-04-12 VITALS — BODY MASS INDEX: 19.53 KG/M2 | HEIGHT: 63 IN | WEIGHT: 110.23 LBS

## 2018-04-12 VITALS
HEART RATE: 101 BPM | TEMPERATURE: 98.1 F | SYSTOLIC BLOOD PRESSURE: 111 MMHG | OXYGEN SATURATION: 97 % | RESPIRATION RATE: 16 BRPM | DIASTOLIC BLOOD PRESSURE: 57 MMHG

## 2018-04-12 DIAGNOSIS — R19.7: Primary | ICD-10-CM

## 2018-04-12 LAB
ALBUMIN SERPL-MCNC: 2 GM/DL (ref 3.4–5)
ALP SERPL-CCNC: 140 U/L (ref 45–117)
ALT SERPL-CCNC: 86 U/L (ref 10–53)
AMORPHOUS SEDIMENT, URINE: (no result)
AST SERPL-CCNC: 108 U/L (ref 15–37)
BACTERIA #/AREA URNS HPF: (no result) /HPF
BASOPHILS # BLD AUTO: 0.1 TH/MM3 (ref 0–0.2)
BASOPHILS NFR BLD: 0.8 % (ref 0–2)
BILIRUB SERPL-MCNC: 0.2 MG/DL (ref 0.2–1)
BUN SERPL-MCNC: 7 MG/DL (ref 7–18)
CALCIUM SERPL-MCNC: 8.3 MG/DL (ref 8.5–10.1)
CHLORIDE SERPL-SCNC: 103 MEQ/L (ref 98–107)
COLOR UR: YELLOW
CREAT SERPL-MCNC: 0.71 MG/DL (ref 0.5–1)
EOSINOPHIL # BLD: 0.2 TH/MM3 (ref 0–0.4)
EOSINOPHIL NFR BLD: 2 % (ref 0–4)
ERYTHROCYTE [DISTWIDTH] IN BLOOD BY AUTOMATED COUNT: 14.3 % (ref 11.6–17.2)
GFR SERPLBLD BASED ON 1.73 SQ M-ARVRAT: 82 ML/MIN (ref 89–?)
GLUCOSE SERPL-MCNC: 139 MG/DL (ref 74–106)
GLUCOSE UR STRIP-MCNC: (no result) MG/DL
HCO3 BLD-SCNC: 32.8 MEQ/L (ref 21–32)
HCT VFR BLD CALC: 31.2 % (ref 35–46)
HGB BLD-MCNC: 10.5 GM/DL (ref 11.6–15.3)
HGB UR QL STRIP: (no result)
KETONES UR STRIP-MCNC: (no result) MG/DL
LYMPHOCYTES # BLD AUTO: 1.8 TH/MM3 (ref 1–4.8)
LYMPHOCYTES NFR BLD AUTO: 15.8 % (ref 9–44)
MCH RBC QN AUTO: 29.1 PG (ref 27–34)
MCHC RBC AUTO-ENTMCNC: 33.6 % (ref 32–36)
MCV RBC AUTO: 86.5 FL (ref 80–100)
MONOCYTE #: 0.6 TH/MM3 (ref 0–0.9)
MONOCYTES NFR BLD: 5.1 % (ref 0–8)
MUCOUS THREADS #/AREA URNS LPF: (no result) /LPF
NEUTROPHILS # BLD AUTO: 8.6 TH/MM3 (ref 1.8–7.7)
NEUTROPHILS NFR BLD AUTO: 76.3 % (ref 16–70)
NITRITE UR QL STRIP: (no result)
PLATELET # BLD: 737 TH/MM3 (ref 150–450)
PMV BLD AUTO: 6.4 FL (ref 7–11)
PROT SERPL-MCNC: 6.4 GM/DL (ref 6.4–8.2)
RBC # BLD AUTO: 3.61 MIL/MM3 (ref 4–5.3)
SODIUM SERPL-SCNC: 142 MEQ/L (ref 136–145)
SP GR UR STRIP: 1.02 (ref 1–1.03)
SQUAMOUS #/AREA URNS HPF: 13 /HPF (ref 0–5)
URINE LEUKOCYTE ESTERASE: (no result)
WBC # BLD AUTO: 11.2 TH/MM3 (ref 4–11)

## 2018-04-12 PROCEDURE — 85025 COMPLETE CBC W/AUTO DIFF WBC: CPT

## 2018-04-12 PROCEDURE — 81001 URINALYSIS AUTO W/SCOPE: CPT

## 2018-04-12 PROCEDURE — 83690 ASSAY OF LIPASE: CPT

## 2018-04-12 PROCEDURE — 80053 COMPREHEN METABOLIC PANEL: CPT

## 2018-04-12 PROCEDURE — 87086 URINE CULTURE/COLONY COUNT: CPT

## 2018-04-12 PROCEDURE — 99281 EMR DPT VST MAYX REQ PHY/QHP: CPT
